# Patient Record
Sex: MALE | Race: WHITE
[De-identification: names, ages, dates, MRNs, and addresses within clinical notes are randomized per-mention and may not be internally consistent; named-entity substitution may affect disease eponyms.]

---

## 2017-03-29 ENCOUNTER — HOSPITAL ENCOUNTER (EMERGENCY)
Dept: HOSPITAL 8 - ED | Age: 52
Discharge: HOME | End: 2017-03-29
Payer: MEDICAID

## 2017-03-29 VITALS — SYSTOLIC BLOOD PRESSURE: 129 MMHG | DIASTOLIC BLOOD PRESSURE: 73 MMHG

## 2017-03-29 VITALS — WEIGHT: 154.32 LBS | BODY MASS INDEX: 24.22 KG/M2 | HEIGHT: 67 IN

## 2017-03-29 DIAGNOSIS — J44.1: Primary | ICD-10-CM

## 2017-03-29 DIAGNOSIS — R42: ICD-10-CM

## 2017-03-29 DIAGNOSIS — F17.210: ICD-10-CM

## 2017-03-29 DIAGNOSIS — J18.1: ICD-10-CM

## 2017-03-29 DIAGNOSIS — J45.909: ICD-10-CM

## 2017-03-29 LAB
AST SERPL-CCNC: 21 U/L (ref 15–37)
BUN SERPL-MCNC: 9 MG/DL (ref 7–18)
HGB BLD-MCNC: 15.7 G/DL (ref 13.7–18)

## 2017-03-29 PROCEDURE — 85025 COMPLETE CBC W/AUTO DIFF WBC: CPT

## 2017-03-29 PROCEDURE — 94640 AIRWAY INHALATION TREATMENT: CPT

## 2017-03-29 PROCEDURE — 80053 COMPREHEN METABOLIC PANEL: CPT

## 2017-03-29 PROCEDURE — 96374 THER/PROPH/DIAG INJ IV PUSH: CPT

## 2017-03-29 PROCEDURE — 71010: CPT

## 2017-03-29 PROCEDURE — 99285 EMERGENCY DEPT VISIT HI MDM: CPT

## 2017-03-29 PROCEDURE — 36415 COLL VENOUS BLD VENIPUNCTURE: CPT

## 2017-07-16 ENCOUNTER — HOSPITAL ENCOUNTER (EMERGENCY)
Dept: HOSPITAL 8 - ED | Age: 52
Discharge: HOME | End: 2017-07-16
Payer: MEDICAID

## 2017-07-16 VITALS — WEIGHT: 142.2 LBS | BODY MASS INDEX: 22.32 KG/M2 | HEIGHT: 67 IN

## 2017-07-16 VITALS — DIASTOLIC BLOOD PRESSURE: 82 MMHG | SYSTOLIC BLOOD PRESSURE: 116 MMHG

## 2017-07-16 DIAGNOSIS — J44.1: Primary | ICD-10-CM

## 2017-07-16 DIAGNOSIS — J45.909: ICD-10-CM

## 2017-07-16 DIAGNOSIS — Z88.0: ICD-10-CM

## 2017-07-16 PROCEDURE — 93005 ELECTROCARDIOGRAM TRACING: CPT

## 2017-07-16 PROCEDURE — 94640 AIRWAY INHALATION TREATMENT: CPT

## 2017-07-16 PROCEDURE — 99283 EMERGENCY DEPT VISIT LOW MDM: CPT

## 2017-08-17 ENCOUNTER — HOSPITAL ENCOUNTER (INPATIENT)
Facility: MEDICAL CENTER | Age: 52
LOS: 1 days | DRG: 189 | End: 2017-08-18
Attending: EMERGENCY MEDICINE | Admitting: INTERNAL MEDICINE
Payer: MEDICAID

## 2017-08-17 ENCOUNTER — RESOLUTE PROFESSIONAL BILLING HOSPITAL PROF FEE (OUTPATIENT)
Dept: HOSPITALIST | Facility: MEDICAL CENTER | Age: 52
End: 2017-08-17
Payer: MEDICAID

## 2017-08-17 ENCOUNTER — APPOINTMENT (OUTPATIENT)
Dept: RADIOLOGY | Facility: MEDICAL CENTER | Age: 52
DRG: 189 | End: 2017-08-17
Attending: EMERGENCY MEDICINE
Payer: MEDICAID

## 2017-08-17 DIAGNOSIS — J96.01 ACUTE RESPIRATORY FAILURE WITH HYPOXIA (HCC): ICD-10-CM

## 2017-08-17 PROBLEM — J44.1 COPD EXACERBATION (HCC): Status: ACTIVE | Noted: 2017-08-17

## 2017-08-17 PROBLEM — J96.02 ACUTE RESPIRATORY FAILURE WITH HYPOXIA AND HYPERCAPNIA (HCC): Status: ACTIVE | Noted: 2017-08-17

## 2017-08-17 PROBLEM — D72.829 LEUKOCYTOSIS: Status: ACTIVE | Noted: 2017-08-17

## 2017-08-17 PROBLEM — J96.00 ACUTE RESPIRATORY FAILURE (HCC): Status: ACTIVE | Noted: 2017-08-17

## 2017-08-17 PROBLEM — R09.02 HYPOXEMIA: Status: ACTIVE | Noted: 2017-08-17

## 2017-08-17 PROBLEM — R00.0 TACHYCARDIA: Status: ACTIVE | Noted: 2017-08-17

## 2017-08-17 LAB
ALBUMIN SERPL BCP-MCNC: 4.2 G/DL (ref 3.2–4.9)
ALBUMIN/GLOB SERPL: 1.4 G/DL
ALP SERPL-CCNC: 63 U/L (ref 30–99)
ALT SERPL-CCNC: 14 U/L (ref 2–50)
ANION GAP SERPL CALC-SCNC: 8 MMOL/L (ref 0–11.9)
APTT PPP: 27.4 SEC (ref 24.7–36)
AST SERPL-CCNC: 20 U/L (ref 12–45)
BASE EXCESS BLDA CALC-SCNC: 1 MMOL/L (ref -4–3)
BASOPHILS # BLD AUTO: 0.8 % (ref 0–1.8)
BASOPHILS # BLD: 0.09 K/UL (ref 0–0.12)
BILIRUB SERPL-MCNC: 0.4 MG/DL (ref 0.1–1.5)
BLOOD CULTURE HOLD CXBCH: NORMAL
BLOOD CULTURE HOLD CXBCH: NORMAL
BNP SERPL-MCNC: 40 PG/ML (ref 0–100)
BODY TEMPERATURE: ABNORMAL DEGREES
BUN SERPL-MCNC: 8 MG/DL (ref 8–22)
CALCIUM SERPL-MCNC: 9.5 MG/DL (ref 8.5–10.5)
CHLORIDE SERPL-SCNC: 104 MMOL/L (ref 96–112)
CO2 BLDA-SCNC: 29 MMOL/L (ref 20–33)
CO2 SERPL-SCNC: 26 MMOL/L (ref 20–33)
CREAT SERPL-MCNC: 0.69 MG/DL (ref 0.5–1.4)
EOSINOPHIL # BLD AUTO: 0.62 K/UL (ref 0–0.51)
EOSINOPHIL NFR BLD: 5.7 % (ref 0–6.9)
ERYTHROCYTE [DISTWIDTH] IN BLOOD BY AUTOMATED COUNT: 43.3 FL (ref 35.9–50)
GFR SERPL CREATININE-BSD FRML MDRD: >60 ML/MIN/1.73 M 2
GLOBULIN SER CALC-MCNC: 3.1 G/DL (ref 1.9–3.5)
GLUCOSE SERPL-MCNC: 85 MG/DL (ref 65–99)
HCO3 BLDA-SCNC: 27.3 MMOL/L (ref 17–25)
HCT VFR BLD AUTO: 48.3 % (ref 42–52)
HGB BLD-MCNC: 16.2 G/DL (ref 14–18)
IMM GRANULOCYTES # BLD AUTO: 0.03 K/UL (ref 0–0.11)
IMM GRANULOCYTES NFR BLD AUTO: 0.3 % (ref 0–0.9)
INR PPP: 0.94 (ref 0.87–1.13)
LYMPHOCYTES # BLD AUTO: 1.94 K/UL (ref 1–4.8)
LYMPHOCYTES NFR BLD: 17.7 % (ref 22–41)
MAGNESIUM SERPL-MCNC: 2.1 MG/DL (ref 1.5–2.5)
MCH RBC QN AUTO: 31.6 PG (ref 27–33)
MCHC RBC AUTO-ENTMCNC: 33.5 G/DL (ref 33.7–35.3)
MCV RBC AUTO: 94.3 FL (ref 81.4–97.8)
MONOCYTES # BLD AUTO: 1.04 K/UL (ref 0–0.85)
MONOCYTES NFR BLD AUTO: 9.5 % (ref 0–13.4)
NEUTROPHILS # BLD AUTO: 7.25 K/UL (ref 1.82–7.42)
NEUTROPHILS NFR BLD: 66 % (ref 44–72)
NRBC # BLD AUTO: 0 K/UL
NRBC BLD AUTO-RTO: 0 /100 WBC
O2/TOTAL GAS SETTING VFR VENT: 40 %
PCO2 BLDA: 47.8 MMHG (ref 26–37)
PH BLDA: 7.37 [PH] (ref 7.4–7.5)
PHOSPHATE SERPL-MCNC: 4.3 MG/DL (ref 2.5–4.5)
PLATELET # BLD AUTO: 249 K/UL (ref 164–446)
PMV BLD AUTO: 10.1 FL (ref 9–12.9)
PO2 BLDA: 93 MMHG (ref 64–87)
POTASSIUM SERPL-SCNC: 4.5 MMOL/L (ref 3.6–5.5)
PROT SERPL-MCNC: 7.3 G/DL (ref 6–8.2)
PROTHROMBIN TIME: 12.9 SEC (ref 12–14.6)
RBC # BLD AUTO: 5.12 M/UL (ref 4.7–6.1)
SAO2 % BLDA: 97 % (ref 93–99)
SODIUM SERPL-SCNC: 138 MMOL/L (ref 135–145)
SPECIMEN DRAWN FROM PATIENT: ABNORMAL
TROPONIN I SERPL-MCNC: <0.01 NG/ML (ref 0–0.04)
WBC # BLD AUTO: 11 K/UL (ref 4.8–10.8)

## 2017-08-17 PROCEDURE — 96365 THER/PROPH/DIAG IV INF INIT: CPT

## 2017-08-17 PROCEDURE — 84100 ASSAY OF PHOSPHORUS: CPT

## 2017-08-17 PROCEDURE — 700101 HCHG RX REV CODE 250: Performed by: EMERGENCY MEDICINE

## 2017-08-17 PROCEDURE — 99223 1ST HOSP IP/OBS HIGH 75: CPT | Performed by: INTERNAL MEDICINE

## 2017-08-17 PROCEDURE — 99285 EMERGENCY DEPT VISIT HI MDM: CPT

## 2017-08-17 PROCEDURE — 94640 AIRWAY INHALATION TREATMENT: CPT

## 2017-08-17 PROCEDURE — 700111 HCHG RX REV CODE 636 W/ 250 OVERRIDE (IP): Performed by: EMERGENCY MEDICINE

## 2017-08-17 PROCEDURE — 94002 VENT MGMT INPAT INIT DAY: CPT

## 2017-08-17 PROCEDURE — 90732 PPSV23 VACC 2 YRS+ SUBQ/IM: CPT | Performed by: EMERGENCY MEDICINE

## 2017-08-17 PROCEDURE — 83735 ASSAY OF MAGNESIUM: CPT

## 2017-08-17 PROCEDURE — 82803 BLOOD GASES ANY COMBINATION: CPT

## 2017-08-17 PROCEDURE — 700105 HCHG RX REV CODE 258: Performed by: EMERGENCY MEDICINE

## 2017-08-17 PROCEDURE — 93005 ELECTROCARDIOGRAM TRACING: CPT | Performed by: EMERGENCY MEDICINE

## 2017-08-17 PROCEDURE — 700101 HCHG RX REV CODE 250: Performed by: INTERNAL MEDICINE

## 2017-08-17 PROCEDURE — 96375 TX/PRO/DX INJ NEW DRUG ADDON: CPT

## 2017-08-17 PROCEDURE — 700101 HCHG RX REV CODE 250

## 2017-08-17 PROCEDURE — A9270 NON-COVERED ITEM OR SERVICE: HCPCS | Performed by: INTERNAL MEDICINE

## 2017-08-17 PROCEDURE — 700111 HCHG RX REV CODE 636 W/ 250 OVERRIDE (IP): Performed by: INTERNAL MEDICINE

## 2017-08-17 PROCEDURE — 84484 ASSAY OF TROPONIN QUANT: CPT

## 2017-08-17 PROCEDURE — 85730 THROMBOPLASTIN TIME PARTIAL: CPT

## 2017-08-17 PROCEDURE — 85025 COMPLETE CBC W/AUTO DIFF WBC: CPT

## 2017-08-17 PROCEDURE — 80053 COMPREHEN METABOLIC PANEL: CPT

## 2017-08-17 PROCEDURE — 85610 PROTHROMBIN TIME: CPT

## 2017-08-17 PROCEDURE — 36600 WITHDRAWAL OF ARTERIAL BLOOD: CPT

## 2017-08-17 PROCEDURE — 83880 ASSAY OF NATRIURETIC PEPTIDE: CPT

## 2017-08-17 PROCEDURE — 90471 IMMUNIZATION ADMIN: CPT

## 2017-08-17 PROCEDURE — 3E0234Z INTRODUCTION OF SERUM, TOXOID AND VACCINE INTO MUSCLE, PERCUTANEOUS APPROACH: ICD-10-PCS | Performed by: EMERGENCY MEDICINE

## 2017-08-17 PROCEDURE — 770020 HCHG ROOM/CARE - TELE (206)

## 2017-08-17 PROCEDURE — 71010 DX-CHEST-PORTABLE (1 VIEW): CPT

## 2017-08-17 PROCEDURE — 700102 HCHG RX REV CODE 250 W/ 637 OVERRIDE(OP): Performed by: INTERNAL MEDICINE

## 2017-08-17 RX ORDER — NICOTINE 21 MG/24HR
14 PATCH, TRANSDERMAL 24 HOURS TRANSDERMAL
Status: DISCONTINUED | OUTPATIENT
Start: 2017-08-17 | End: 2017-08-18 | Stop reason: HOSPADM

## 2017-08-17 RX ORDER — BISACODYL 10 MG
10 SUPPOSITORY, RECTAL RECTAL
Status: DISCONTINUED | OUTPATIENT
Start: 2017-08-17 | End: 2017-08-18 | Stop reason: HOSPADM

## 2017-08-17 RX ORDER — TIOTROPIUM BROMIDE 18 UG/1
18 CAPSULE ORAL; RESPIRATORY (INHALATION) DAILY
Status: ON HOLD | COMMUNITY
End: 2017-08-18

## 2017-08-17 RX ORDER — MAGNESIUM SULFATE 1 G/100ML
1 INJECTION INTRAVENOUS ONCE
Status: COMPLETED | OUTPATIENT
Start: 2017-08-17 | End: 2017-08-17

## 2017-08-17 RX ORDER — LABETALOL HYDROCHLORIDE 5 MG/ML
5 INJECTION, SOLUTION INTRAVENOUS EVERY 4 HOURS PRN
Status: DISCONTINUED | OUTPATIENT
Start: 2017-08-17 | End: 2017-08-18 | Stop reason: HOSPADM

## 2017-08-17 RX ORDER — AZITHROMYCIN 250 MG/1
500 TABLET, FILM COATED ORAL ONCE
Status: COMPLETED | OUTPATIENT
Start: 2017-08-17 | End: 2017-08-17

## 2017-08-17 RX ORDER — AZITHROMYCIN 250 MG/1
250 TABLET, FILM COATED ORAL DAILY
Status: DISCONTINUED | OUTPATIENT
Start: 2017-08-18 | End: 2017-08-18 | Stop reason: HOSPADM

## 2017-08-17 RX ORDER — METHYLPREDNISOLONE SODIUM SUCCINATE 125 MG/2ML
125 INJECTION, POWDER, LYOPHILIZED, FOR SOLUTION INTRAMUSCULAR; INTRAVENOUS ONCE
Status: COMPLETED | OUTPATIENT
Start: 2017-08-17 | End: 2017-08-17

## 2017-08-17 RX ORDER — IPRATROPIUM BROMIDE AND ALBUTEROL SULFATE 2.5; .5 MG/3ML; MG/3ML
3 SOLUTION RESPIRATORY (INHALATION)
Status: DISCONTINUED | OUTPATIENT
Start: 2017-08-17 | End: 2017-08-18 | Stop reason: HOSPADM

## 2017-08-17 RX ORDER — ACETAMINOPHEN 325 MG/1
650 TABLET ORAL EVERY 6 HOURS PRN
Status: DISCONTINUED | OUTPATIENT
Start: 2017-08-17 | End: 2017-08-18 | Stop reason: HOSPADM

## 2017-08-17 RX ORDER — IPRATROPIUM BROMIDE AND ALBUTEROL SULFATE 2.5; .5 MG/3ML; MG/3ML
3 SOLUTION RESPIRATORY (INHALATION)
Status: DISCONTINUED | OUTPATIENT
Start: 2017-08-17 | End: 2017-08-17 | Stop reason: ALTCHOICE

## 2017-08-17 RX ORDER — SODIUM CHLORIDE 9 MG/ML
1000 INJECTION, SOLUTION INTRAVENOUS ONCE
Status: COMPLETED | OUTPATIENT
Start: 2017-08-17 | End: 2017-08-17

## 2017-08-17 RX ORDER — HEPARIN SODIUM 5000 [USP'U]/ML
5000 INJECTION, SOLUTION INTRAVENOUS; SUBCUTANEOUS EVERY 8 HOURS
Status: DISCONTINUED | OUTPATIENT
Start: 2017-08-17 | End: 2017-08-18 | Stop reason: HOSPADM

## 2017-08-17 RX ORDER — BUDESONIDE AND FORMOTEROL FUMARATE DIHYDRATE 80; 4.5 UG/1; UG/1
2 AEROSOL RESPIRATORY (INHALATION)
Status: DISCONTINUED | OUTPATIENT
Start: 2017-08-17 | End: 2017-08-18 | Stop reason: HOSPADM

## 2017-08-17 RX ORDER — POLYETHYLENE GLYCOL 3350 17 G/17G
1 POWDER, FOR SOLUTION ORAL
Status: DISCONTINUED | OUTPATIENT
Start: 2017-08-17 | End: 2017-08-18 | Stop reason: HOSPADM

## 2017-08-17 RX ORDER — AMOXICILLIN 250 MG
2 CAPSULE ORAL 2 TIMES DAILY
Status: DISCONTINUED | OUTPATIENT
Start: 2017-08-17 | End: 2017-08-18 | Stop reason: HOSPADM

## 2017-08-17 RX ORDER — METHYLPREDNISOLONE SODIUM SUCCINATE 125 MG/2ML
60 INJECTION, POWDER, LYOPHILIZED, FOR SOLUTION INTRAMUSCULAR; INTRAVENOUS EVERY 6 HOURS
Status: DISCONTINUED | OUTPATIENT
Start: 2017-08-17 | End: 2017-08-18 | Stop reason: HOSPADM

## 2017-08-17 RX ORDER — IPRATROPIUM BROMIDE AND ALBUTEROL SULFATE 2.5; .5 MG/3ML; MG/3ML
3 SOLUTION RESPIRATORY (INHALATION)
Status: DISCONTINUED | OUTPATIENT
Start: 2017-08-17 | End: 2017-08-17

## 2017-08-17 RX ORDER — ALBUTEROL SULFATE 2.5 MG/3ML
2.5 SOLUTION RESPIRATORY (INHALATION) EVERY 4 HOURS PRN
Status: ON HOLD | COMMUNITY
End: 2017-08-18

## 2017-08-17 RX ADMIN — METHYLPREDNISOLONE SODIUM SUCCINATE 60 MG: 125 INJECTION, POWDER, FOR SOLUTION INTRAMUSCULAR; INTRAVENOUS at 18:06

## 2017-08-17 RX ADMIN — IPRATROPIUM BROMIDE AND ALBUTEROL SULFATE 3 ML: .5; 3 SOLUTION RESPIRATORY (INHALATION) at 14:31

## 2017-08-17 RX ADMIN — AZITHROMYCIN 500 MG: 250 TABLET, FILM COATED ORAL at 11:59

## 2017-08-17 RX ADMIN — ALBUTEROL SULFATE 10 MG/HR: 5 SOLUTION RESPIRATORY (INHALATION) at 07:45

## 2017-08-17 RX ADMIN — PNEUMOCOCCAL VACCINE POLYVALENT 25 MCG
25; 25; 25; 25; 25; 25; 25; 25; 25; 25; 25; 25; 25; 25; 25; 25; 25; 25; 25; 25; 25; 25; 25 INJECTION, SOLUTION INTRAMUSCULAR; SUBCUTANEOUS at 14:10

## 2017-08-17 RX ADMIN — MAGNESIUM SULFATE IN DEXTROSE 1 G: 10 INJECTION, SOLUTION INTRAVENOUS at 08:47

## 2017-08-17 RX ADMIN — HEPARIN SODIUM 5000 UNITS: 5000 INJECTION, SOLUTION INTRAVENOUS; SUBCUTANEOUS at 14:09

## 2017-08-17 RX ADMIN — SODIUM CHLORIDE 1000 ML: 9 INJECTION, SOLUTION INTRAVENOUS at 07:53

## 2017-08-17 RX ADMIN — FENTANYL CITRATE 50 MCG: 50 INJECTION, SOLUTION INTRAMUSCULAR; INTRAVENOUS at 08:47

## 2017-08-17 RX ADMIN — METHYLPREDNISOLONE SODIUM SUCCINATE 60 MG: 125 INJECTION, POWDER, FOR SOLUTION INTRAMUSCULAR; INTRAVENOUS at 14:30

## 2017-08-17 RX ADMIN — METHYLPREDNISOLONE SODIUM SUCCINATE 125 MG: 125 INJECTION, POWDER, FOR SOLUTION INTRAMUSCULAR; INTRAVENOUS at 07:53

## 2017-08-17 RX ADMIN — HEPARIN SODIUM 5000 UNITS: 5000 INJECTION, SOLUTION INTRAVENOUS; SUBCUTANEOUS at 21:01

## 2017-08-17 RX ADMIN — NICOTINE 14 MG: 14 PATCH, EXTENDED RELEASE TRANSDERMAL at 11:59

## 2017-08-17 RX ADMIN — IPRATROPIUM BROMIDE AND ALBUTEROL SULFATE 3 ML: .5; 3 SOLUTION RESPIRATORY (INHALATION) at 09:55

## 2017-08-17 RX ADMIN — IPRATROPIUM BROMIDE 0.2 MG: 0.5 SOLUTION RESPIRATORY (INHALATION) at 07:45

## 2017-08-17 ASSESSMENT — LIFESTYLE VARIABLES
HOW MANY TIMES IN THE PAST YEAR HAVE YOU HAD 5 OR MORE DRINKS IN A DAY: 1
ON A TYPICAL DAY WHEN YOU DRINK ALCOHOL HOW MANY DRINKS DO YOU HAVE: 1
CONSUMPTION TOTAL: POSITIVE
TOTAL SCORE: 0
EVER_SMOKED: YES
PACK_YEARS: 40
EVER_SMOKED: YES
ALCOHOL_USE: YES
TOTAL SCORE: 0
PACK_YEARS: 40
HAVE PEOPLE ANNOYED YOU BY CRITICIZING YOUR DRINKING: NO
AVERAGE NUMBER OF DAYS PER WEEK YOU HAVE A DRINK CONTAINING ALCOHOL: 3
HAVE YOU EVER FELT YOU SHOULD CUT DOWN ON YOUR DRINKING: NO
EVER HAD A DRINK FIRST THING IN THE MORNING TO STEADY YOUR NERVES TO GET RID OF A HANGOVER: NO
TOTAL SCORE: 0
EVER FELT BAD OR GUILTY ABOUT YOUR DRINKING: NO

## 2017-08-17 ASSESSMENT — COPD QUESTIONNAIRES
DO YOU EVER COUGH UP ANY MUCUS OR PHLEGM?: YES, A FEW DAYS A WEEK OR MONTH
DURING THE PAST 4 WEEKS HOW MUCH DID YOU FEEL SHORT OF BREATH: SOME OF THE TIME
DURING THE PAST 4 WEEKS HOW MUCH DID YOU FEEL SHORT OF BREATH: SOME OF THE TIME
DO YOU EVER COUGH UP ANY MUCUS OR PHLEGM?: NO/ONLY WITH OCCASIONAL COLDS OR INFECTIONS
HAVE YOU SMOKED AT LEAST 100 CIGARETTES IN YOUR ENTIRE LIFE: YES
COPD SCREENING SCORE: 4
HAVE YOU SMOKED AT LEAST 100 CIGARETTES IN YOUR ENTIRE LIFE: YES
COPD SCREENING SCORE: 5

## 2017-08-17 ASSESSMENT — PATIENT HEALTH QUESTIONNAIRE - PHQ9
1. LITTLE INTEREST OR PLEASURE IN DOING THINGS: NOT AT ALL
SUM OF ALL RESPONSES TO PHQ QUESTIONS 1-9: 0
SUM OF ALL RESPONSES TO PHQ9 QUESTIONS 1 AND 2: 0
2. FEELING DOWN, DEPRESSED, IRRITABLE, OR HOPELESS: NOT AT ALL

## 2017-08-17 ASSESSMENT — PULMONARY FUNCTION TESTS
EPAP_CMH2O: 5
EPAP_CMH2O: 5

## 2017-08-17 ASSESSMENT — PAIN SCALES - GENERAL
PAINLEVEL_OUTOF10: 6
PAINLEVEL_OUTOF10: 4
PAINLEVEL_OUTOF10: 0
PAINLEVEL_OUTOF10: 0

## 2017-08-17 NOTE — IP AVS SNAPSHOT
8/18/2017    Osmar Loomis  [address]  [facility] [address]    Dear Osmar:    [facility] wants to ensure your discharge home is safe and you or your loved ones have had all of your questions answered regarding your care after you leave the hospital.    Below is a list of resources and contact information should you have any questions regarding your hospital stay, follow-up instructions, or active medical symptoms.    Questions or Concerns Regarding… Contact   Medical Questions Related to Your Discharge  (7 days a week, 8am-5pm) Contact a Nurse Care Coordinator   [phone]   Medical Questions Not Related to Your Discharge  (24 hours a day / 7 days a week)  Contact the Nurse Health Line   [phone]    Medications or Discharge Instructions Refer to your discharge packet   or contact your [facility] Primary Care Provider   [phone]   Follow-up Appointment(s) Schedule your appointment via [employer]   or contact Scheduling [phone]   Billing Review your statement via [employer]  or contact Billing [phone]   Medical Records Review your records via [employer]   or contact Medical Records [phone]     You may receive a telephone call within two days of discharge. This call is to make certain you understand your discharge instructions and have the opportunity to have any questions answered. You can also easily access your medical information, test results and upcoming appointments via the [employer] free online health management tool. You can learn more and sign up at [employer]. For assistance setting up your [employer] account, please call [phone].    Once again, we want to ensure your discharge home is safe and that you have a clear understanding of any next steps in your care. If you have any questions or concerns, please do not hesitate to contact us, we are here for you. Thank you for choosing [facility] for your healthcare needs.    Sincerely,    Your [facility]  Team

## 2017-08-17 NOTE — H&P
HOSPITAL MEDICINE HISTORY/ PHYSICAL    Date & Time note created:    8/17/2017   9:21 AM       Date & Time Patient was seen:   8/17/2017    Patient ID:   Name: Osmar Loomis  . YOB: 1965. Age: 52 y.o. male. MRN: [id number]    Admitting Attending:  Beverly Fields     PCP : Pcp Pt States None        Chief Complaint:       Shortness of breath    History of Present Illness:    Osmar Loomis is a 52 y.o. male who has history of COPD presents in respiratory distress. Patient started getting   short of breath yesterday. Ran out of all of his inhalers. Patient was noticed to have severe COPD exacerbation and   acute respiratory failure in the ER. Patient was initially treated with BiPAP and also with breathing treatment. Patient's   symptoms improved after BiPAP treatment. Patient currently is off BiPAP. Patient otherwise denies fever, chills, nausea,   vomiting, adb pain,  CP, headache, constipation, diarrhea, cough, or sputum.    Review of Systems:      per HPI otherwise 14 points reviewed 12 systems neg per AMA/CMS criteria.        Past Medical/ Family / Social history (PFSH):   Past Medical History   Diagnosis Date   • Asthma    • COPD (chronic obstructive pulmonary disease) (CMS-[facility])      History reviewed. No pertinent past surgical history.  Current Outpatient Medications:  No current facility-administered medications on file prior to encounter.     Current Outpatient Prescriptions on File Prior to Encounter   Medication Sig Dispense Refill   • albuterol (PROVENTIL) 2.5mg/3ml NEBU solution for nebulization 3 mL by Nebulization route every four hours as needed for Shortness of Breath. 25 mL 1   • albuterol (VENTOLIN OR PROVENTIL) 108 (90 BASE) MCG/ACT AERS inhalation aerosol Inhale 2 Puffs by mouth every 2 hours as needed for Shortness of Breath. 1 Inhaler 2   • albuterol (VENTOLIN OR PROVENTIL) 108 (90 BASE) MCG/ACT AERS inhalation aerosol Inhale 2 Puffs by mouth every 2 hours as needed for Shortness  of Breath. 1 Inhaler 2   • albuterol-ipratropium (COMBIVENT)  MCG/ACT AERO Inhale 2 Puffs by mouth 4 times a day.       Medication Allergy/Sensitivities:  Allergies   Allergen Reactions   • Pcn [Penicillins]      Family History:  History reviewed. No pertinent family history.   reviewed and felt non pertinent to this encounter     Social History:  Social History   Substance Use Topics   • Smoking status: Former Smoker -- 0.02 packs/day     Quit date: 07/17/2017   • Smokeless tobacco: None   • Alcohol Use: No     #################################################################  Physical Exam:   Vitals/ General Appearance:   Weight/BMI: Body mass index is 25.05 kg/(m^2).  Blood pressure 169/124, pulse 111, temperature 36 °C (96.8 °F), resp. rate 24, weight 72.576 kg (160 lb), SpO2 95 %.   Filed Vitals:    08/17/17 0800 08/17/17 0830 08/17/17 0846 08/17/17 0901   BP:       Pulse: 87 104 102 111   Temp:       Resp: 23 17 20 24   Weight:       SpO2: 99% 99% 98% 95%    Oxygen Therapy:  Pulse Oximetry: 95 %, O2 (LPM): 2, O2 Delivery: Nasal Cannula    Constitutional:  well developed, well nourished, non-toxic, no acute distress  HENMT: Normocephalic, atraumatic, b/l ears normal, nose normal  Eyes:  EOMI, conjunctiva normal, no discharge  Neck: no tracheal deviation, supple  Cardiovascular: tachycardic, normal rhythm, no murmurs, no rubs or gallops; no cyanosis, clubbing or edema  Lungs: Respiratory effort is normal, normal breath sounds, breath sounds clear to auscultation b/l, no rales, rhonchi or wheezing  Abdomen: soft, non-tender, no guarding or rebound, active BS, no mass  Skin: warm, dry, no erythema, no rash  Neurologic: Alert and oriented, strength 5/5, no focal deficits, CN II-XII normal  Psychiatric: No anxiety or depression  Lymph node: No lymphadenopathy appreciated in the neck groin and axillary area.   Extremities: Bilateral lower extremities no pitting edema, bilateral pulses  symmetric  #################################################################  Lab Data Review:    Objective  Recent Results (from the past 24 hour(s))   CBC w/ Differential    Collection Time: 08/17/17  7:35 AM   Result Value Ref Range    WBC 11.0 (H) 4.8 - 10.8 K/uL    RBC 5.12 4.70 - 6.10 M/uL    Hemoglobin 16.2 14.0 - 18.0 g/dL    Hematocrit 48.3 42.0 - 52.0 %    MCV 94.3 81.4 - 97.8 fL    MCH 31.6 27.0 - 33.0 pg    MCHC 33.5 (L) 33.7 - 35.3 g/dL    RDW 43.3 35.9 - 50.0 fL    Platelet Count 249 164 - 446 K/uL    MPV 10.1 9.0 - 12.9 fL    Neutrophils-Polys 66.00 44.00 - 72.00 %    Lymphocytes 17.70 (L) 22.00 - 41.00 %    Monocytes 9.50 0.00 - 13.40 %    Eosinophils 5.70 0.00 - 6.90 %    Basophils 0.80 0.00 - 1.80 %    Immature Granulocytes 0.30 0.00 - 0.90 %    Nucleated RBC 0.00 /100 WBC    Neutrophils (Absolute) 7.25 1.82 - 7.42 K/uL    Lymphs (Absolute) 1.94 1.00 - 4.80 K/uL    Monos (Absolute) 1.04 (H) 0.00 - 0.85 K/uL    Eos (Absolute) 0.62 (H) 0.00 - 0.51 K/uL    Baso (Absolute) 0.09 0.00 - 0.12 K/uL    Immature Granulocytes (abs) 0.03 0.00 - 0.11 K/uL    NRBC (Absolute) 0.00 K/uL   Complete Metabolic Panel (CMP)    Collection Time: 08/17/17  7:35 AM   Result Value Ref Range    Sodium 138 135 - 145 mmol/L    Potassium 4.5 3.6 - 5.5 mmol/L    Chloride 104 96 - 112 mmol/L    Co2 26 20 - 33 mmol/L    Anion Gap 8.0 0.0 - 11.9    Glucose 85 65 - 99 mg/dL    Bun 8 8 - 22 mg/dL    Creatinine 0.69 0.50 - 1.40 mg/dL    Calcium 9.5 8.5 - 10.5 mg/dL    AST(SGOT) 20 12 - 45 U/L    ALT(SGPT) 14 2 - 50 U/L    Alkaline Phosphatase 63 30 - 99 U/L    Total Bilirubin 0.4 0.1 - 1.5 mg/dL    Albumin 4.2 3.2 - 4.9 g/dL    Total Protein 7.3 6.0 - 8.2 g/dL    Globulin 3.1 1.9 - 3.5 g/dL    A-G Ratio 1.4 g/dL   Btype Natriuretic Peptide    Collection Time: 08/17/17  7:35 AM   Result Value Ref Range    B Natriuretic Peptide 40 0 - 100 pg/mL   Troponin STAT    Collection Time: 08/17/17  7:35 AM   Result Value Ref Range    Troponin I  <0.01 0.00 - 0.04 ng/mL   Magnesium    Collection Time: 08/17/17  7:35 AM   Result Value Ref Range    Magnesium 2.1 1.5 - 2.5 mg/dL   Phosphorus    Collection Time: 08/17/17  7:35 AM   Result Value Ref Range    Phosphorus 4.3 2.5 - 4.5 mg/dL   ESTIMATED GFR    Collection Time: 08/17/17  7:35 AM   Result Value Ref Range    GFR If African American >60 >60 mL/min/1.73 m 2    GFR If Non African American >60 >60 mL/min/1.73 m 2   BLOOD CULTURE,HOLD    Collection Time: 08/17/17  7:35 AM   Result Value Ref Range    Blood Culture Hold Collected    APTT    Collection Time: 08/17/17  7:51 AM   Result Value Ref Range    APTT 27.4 24.7 - 36.0 sec   PT/INR    Collection Time: 08/17/17  7:51 AM   Result Value Ref Range    PT 12.9 12.0 - 14.6 sec    INR 0.94 0.87 - 1.13   BLOOD CULTURE,HOLD    Collection Time: 08/17/17  7:52 AM   Result Value Ref Range    Blood Culture Hold Collected    ISTAT ARTERIAL BLOOD GAS    Collection Time: 08/17/17  8:13 AM   Result Value Ref Range    Ph 7.366 (L) 7.400 - 7.500    Pco2 47.8 (H) 26.0 - 37.0 mmHg    Po2 93 (H) 64 - 87 mmHg    Tco2 29 20 - 33 mmol/L    S02 97 93 - 99 %    Hco3 27.3 (H) 17.0 - 25.0 mmol/L    BE 1 -4 - 3 mmol/L    Body Temp see below degrees    O2 Therapy 40 %    Specimen Arterial        (click the triangle to expand results)    Imaging/Procedures Review:    DX-CHEST-PORTABLE (1 VIEW)   Final Result      7.5 mm nodular density in the left midlung zone. Further evaluation with CT chest is recommended.        EKG:   per my independant read:  HR: 110, Normal Sinus Rhythm, no ST/T changes    Assessment and Plan:      * Acute respiratory failure with hypoxia and hypercapnia (CMS-HCC)  Assessment & Plan  Acute COPD exacerbation due to run out of medication at home  Hypoxemia  Needed BiPAP initially, but currently off BiPAP  Close monitor and manage COPD    Hypoxemia  Assessment & Plan  O2 and RT  BD    COPD exacerbation (CMS-[facility])  Assessment & Plan  COPD exacerbation  - COPD  protocol  -IV Solu-Medrol  - RT protocol  - Mucinex for congestion      Tachycardia (present on admission)  Assessment & Plan  Due to COPD exacerbation and respiratory failure  Telemetry monitor    Leukocytosis (present on admission)  Assessment & Plan  No signs of infection likely due to stress-induced        1. Prophylaxis: sc heparin  2. Code: Full code per patient   3. Dispo: he will be admitted to inpatient for management that is expected to take more than 2 midnights   I have discussed patient admission status with  in the ER.    I spent 73 minutes evaluating Mr. Loomis, reviewing the chart, vitals, labs and imaging, discussing the case with ED physician, medication reconciliation, placing orders and enacting the plan above.    This dictation was created using voice recognition software. The accuracy of the dictation is limited to the abilities of the software. Although every efforts have been used to decrease the error, I expect there may be some errors of grammar and possibly content.

## 2017-08-17 NOTE — ASSESSMENT & PLAN NOTE
Acute COPD exacerbation due to run out of medication at home  Hypoxemia  Needed BiPAP initially, but currently off BiPAP  Close monitor and manage COPD

## 2017-08-17 NOTE — IP AVS SNAPSHOT
" Home Care Instructions                                                                                                                  Name:Osmar Loomis  Medical Record Number:[id number]  CSN: [id number]    YOB: 1965   Age: 52 y.o.  Sex: male  HT:1.676 m (5' 5.98\") WT: 65.4 kg (144 lb 2.9 oz)          Admit Date: 8/17/2017     Discharge Date:   Today's Date: 8/18/2017  Attending Doctor:  GOPAL Conrad*                  Allergies:  Pcn            Discharge Instructions       Discharge Instructions    Discharged to home by car with self. Discharged via walking, hospital escort: Refused.  Special equipment needed: Not Applicable    Be sure to schedule a follow-up appointment with your primary care doctor or any specialists as instructed.     Discharge Plan:   Smoking Cessation Offered: Patient Counseled  Pneumococcal Vaccine Given - only chart on this line when given: Given (See MAR)  Influenza Vaccine Indication: Indicated: Not available from distributor/    I understand that a diet low in cholesterol, fat, and sodium is recommended for good health. Unless I have been given specific instructions below for another diet, I accept this instruction as my diet prescription.   Other diet: regular    Special Instructions: None    · Is patient discharged on Warfarin / Coumadin?   No     · Is patient Post Blood Transfusion NO    Chronic Obstructive Pulmonary Disease  Chronic obstructive pulmonary disease (COPD) is a common lung condition in which airflow from the lungs is limited. COPD is a general term that can be used to describe many different lung problems that limit airflow, including both chronic bronchitis and emphysema. If you have COPD, your lung function will probably never return to normal, but there are measures you can take to improve lung function and make yourself feel better.  CAUSES   Smoking (common).  Exposure to secondhand smoke.  Genetic problems.  Chronic " inflammatory lung diseases or recurrent infections.  SYMPTOMS  Shortness of breath, especially with physical activity.  Deep, persistent (chronic) cough with a large amount of thick mucus.  Wheezing.  Rapid breaths (tachypnea).  Gray or bluish discoloration (cyanosis) of the skin, especially in your fingers, toes, or lips.  Fatigue.  Weight loss.  Frequent infections or episodes when breathing symptoms become much worse (exacerbations).  Chest tightness.  DIAGNOSIS  Your health care provider will take a medical history and perform a physical examination to diagnose COPD. Additional tests for COPD may include:  Lung (pulmonary) function tests.  Chest X-ray.  CT scan.  Blood tests.  TREATMENT   Treatment for COPD may include:  Inhaler and nebulizer medicines. These help manage the symptoms of COPD and make your breathing more comfortable.  Supplemental oxygen. Supplemental oxygen is only helpful if you have a low oxygen level in your blood.  Exercise and physical activity. These are beneficial for nearly all people with COPD.  Lung surgery or transplant.  Nutrition therapy to gain weight, if you are underweight.  Pulmonary rehabilitation. This may involve working with a team of health care providers and specialists, such as respiratory, occupational, and physical therapists.  HOME CARE INSTRUCTIONS  Take all medicines (inhaled or pills) as directed by your health care provider.  Avoid over-the-counter medicines or cough syrups that dry up your airway (such as antihistamines) and slow down the elimination of secretions unless instructed otherwise by your health care provider.  If you are a smoker, the most important thing that you can do is stop smoking. Continuing to smoke will cause further lung damage and breathing trouble. Ask your health care provider for help with quitting smoking. He or she can direct you to community resources or hospitals that provide support.  Avoid exposure to irritants such as smoke,  chemicals, and fumes that aggravate your breathing.  Use oxygen therapy and pulmonary rehabilitation if directed by your health care provider. If you require home oxygen therapy, ask your health care provider whether you should purchase a pulse oximeter to measure your oxygen level at home.  Avoid contact with individuals who have a contagious illness.  Avoid extreme temperature and humidity changes.  Eat healthy foods. Eating smaller, more frequent meals and resting before meals may help you maintain your strength.  Stay active, but balance activity with periods of rest. Exercise and physical activity will help you maintain your ability to do things you want to do.  Preventing infection and hospitalization is very important when you have COPD. Make sure to receive all the vaccines your health care provider recommends, especially the pneumococcal and influenza vaccines. Ask your health care provider whether you need a pneumonia vaccine.  Learn and use relaxation techniques to manage stress.  Learn and use controlled breathing techniques as directed by your health care provider. Controlled breathing techniques include:  Pursed lip breathing. Start by breathing in (inhaling) through your nose for 1 second. Then, purse your lips as if you were going to whistle and breathe out (exhale) through the pursed lips for 2 seconds.  Diaphragmatic breathing. Start by putting one hand on your abdomen just above your waist. Inhale slowly through your nose. The hand on your abdomen should move out. Then purse your lips and exhale slowly. You should be able to feel the hand on your abdomen moving in as you exhale.  Learn and use controlled coughing to clear mucus from your lungs. Controlled coughing is a series of short, progressive coughs. The steps of controlled coughing are:  Lean your head slightly forward.  Breathe in deeply using diaphragmatic breathing.  Try to hold your breath for 3 seconds.  Keep your mouth slightly open  while coughing twice.  Spit any mucus out into a tissue.  Rest and repeat the steps once or twice as needed.  SEEK MEDICAL CARE IF:  You are coughing up more mucus than usual.  There is a change in the color or thickness of your mucus.  Your breathing is more labored than usual.  Your breathing is faster than usual.  SEEK IMMEDIATE MEDICAL CARE IF:  You have shortness of breath while you are resting.  You have shortness of breath that prevents you from:  Being able to talk.  Performing your usual physical activities.  You have chest pain lasting longer than 5 minutes.  Your skin color is more cyanotic than usual.  You measure low oxygen saturations for longer than 5 minutes with a pulse oximeter.  MAKE SURE YOU:  Understand these instructions.  Will watch your condition.  Will get help right away if you are not doing well or get worse.     This information is not intended to replace advice given to you by your health care provider. Make sure you discuss any questions you have with your health care provider.     Document Released: 09/27/2006 Document Revised: 01/08/2016 Document Reviewed: 08/14/2014  [employer] Interactive Patient Education ©2016 [employer]      Depression / Suicide Risk    As you are discharged from this [facility] facility, it is important to learn how to keep safe from harming yourself.    Recognize the warning signs:  · Abrupt changes in personality, positive or negative- including increase in energy   · Giving away possessions  · Change in eating patterns- significant weight changes-  positive or negative  · Change in sleeping patterns- unable to sleep or sleeping all the time   · Unwillingness or inability to communicate  · Depression  · Unusual sadness, discouragement and loneliness  · Talk of wanting to die  · Neglect of personal appearance   · Rebelliousness- reckless behavior  · Withdrawal from people/activities they love  · Confusion- inability to concentrate     If you or a loved one  observes any of these behaviors or has concerns about self-harm, here's what you can do:  · Talk about it- your feelings and reasons for harming yourself  · Remove any means that you might use to hurt yourself (examples: pills, rope, extension cords, firearm)  · Get professional help from the community (Mental Health, Substance Abuse, psychological counseling)  · Do not be alone:Call your Safe Contact- someone whom you trust who will be there for you.  · Call your local CRISIS HOTLINE 986-8060 or 542-643-3620  · Call your local Children's Mobile Crisis Response Team [place] (244) 298-4018 or www.MemberPass  · Call the toll free National Suicide Prevention Hotlines   · National Suicide Prevention Lifeline 275-021-SFML (8911)  · [place] [employer] Line Network 800-SUICIDE (024-7059)           Discharge Medication Instructions:    Below are the medications your physician expects you to take upon discharge:    Review all your home medications and newly ordered medications with your doctor and/or pharmacist. Follow medication instructions as directed by your doctor and/or pharmacist.    Please keep your medication list with you and share with your physician.               Medication List      START taking these medications        Instructions    Morning Afternoon Evening Bedtime    azithromycin 250 MG Tabs   Last time this was given:  250 mg on 8/18/2017  8:34 AM   Commonly known as:  ZITHROMAX        1 daily                        fluticasone-salmeterol 250-50 MCG/DOSE Aepb   Commonly known as:  ADVAIR        Inhale 1 Puff by mouth every 12 hours.   Dose:  1 Puff                        methylPREDNISolone 4 MG Tabs   Commonly known as:  MEDROL        1 tab 4 times a day for 3 days, 1 tab 3 times a day for 3 days, 1 tab 2 times a day for 3 days, 1 tab daily for 3 days                          CONTINUE taking these medications        Instructions    Morning Afternoon Evening Bedtime    * albuterol 2.5mg/3ml Nebu  solution for nebulization   Commonly known as:  PROVENTIL        3 mL by Nebulization route every four hours as needed for Shortness of Breath.   Dose:  2.5 mg                        * albuterol 108 (90 Base) MCG/ACT Aers inhalation aerosol        Inhale 2 Puffs by mouth every 2 hours as needed for Shortness of Breath.   Dose:  2 Puff                        tiotropium 18 MCG Caps   Commonly known as:  SPIRIVA        Inhale 1 Cap by mouth every day.   Dose:  18 mcg                        * Notice:  This list has 2 medication(s) that are the same as other medications prescribed for you. Read the directions carefully, and ask your doctor or other care provider to review them with you.      STOP taking these medications     BREO ELLIPTA 100-25 MCG/INH Aepb   Generic drug:  Fluticasone Furoate-Vilanterol                    Where to Get Your Medications      These medications were sent to [id number] - [place] - [address] [place] [address] AT [facility] [address] [address]     Phone:  [phone]    - albuterol 108 (90 Base) MCG/ACT Aers inhalation aerosol  - albuterol 2.5mg/3ml Nebu solution for nebulization  - azithromycin 250 MG Tabs  - fluticasone-salmeterol 250-50 MCG/DOSE Aepb  - methylPREDNISolone 4 MG Tabs  - tiotropium 18 MCG Caps            Instructions           Diet / Nutrition:    Follow any diet instructions given to you by your doctor or the dietician, including how much salt (sodium) you are allowed each day.    If you are overweight, talk to your doctor about a weight reduction plan.    Activity:    Remain physically active following your doctor's instructions about exercise and activity.    Rest often.     Any time you become even a little tired or short of breath, SIT DOWN and rest.    Worsening Symptoms:    Report any of the following signs and symptoms to the doctor's office immediately:    *Pain of jaw, arm, or neck  *Chest pain not relieved by medication                                *Dizziness or loss of consciousness  *Difficulty breathing even when at rest   *More tired than usual                                       *Bleeding drainage or swelling of surgical site  *Swelling of feet, ankles, legs or stomach                 *Fever (>100ºF)  *Pink or blood tinged sputum  *Weight gain (3lbs/day or 5lbs /week)           *Shock from internal defibrillator (if applicable)  *Palpitations or irregular heartbeats                *Cool and/or numb extremities    Stroke Awareness    Common Risk Factors for Stroke include:    Age  Atrial Fibrillation  Carotid Artery Stenosis  Diabetes Mellitus  Excessive alcohol consumption  High blood pressure  Overweight   Physical inactivity  Smoking    Warning signs and symptoms of a stroke include:    *Sudden numbness or weakness of the face, arm or leg (especially on one side of the body).  *Sudden confusion, trouble speaking or understanding.  *Sudden trouble seeing in one or both eyes.  *Sudden trouble walking, dizziness, loss of balance or coordination.Sudden severe headache with no known cause.    It is very important to get treatment quickly when a stroke occurs. If you experience any of the above warning signs, call 911 immediately.                   Disclaimer         Quit Smoking / Tobacco Use:    I understand the use of any tobacco products increases my chance of suffering from future heart disease or stroke and could cause other illnesses which may shorten my life. Quitting the use of tobacco products is the single most important thing I can do to improve my health. For further information on smoking / tobacco cessation call a Toll Free Quit Line at 1-364.243.2179 (*[employer]) or 1-458.880.1703 (American Lung Association) or you can access the web based program at www.lungusa.org.    [place] Tobacco Users Help Line:  (246) 582-2562       Toll Free: 1-708.278.3341  Quit Tobacco Program [facility]  (388) 221-6573    Crisis Hotline:    [place] Crisis Hotline:  4-588-FRDCCWS or 1-108.557.9780    [place] Crisis Hotline:    1-584.275.2854 or 605-770-3769    Discharge Survey:   Thank you for choosing [facility]. We hope we did everything we could to make your hospital stay a pleasant one. You may be receiving a phone survey and we would appreciate your time and participation in answering the questions. Your input is very valuable to us in our efforts to improve our service to our patients and their families.        My signature on this form indicates that:    1. I have reviewed and understand the above information.  2. My questions regarding this information have been answered to my satisfaction.  3. I have formulated a plan with my discharge nurse to obtain my prescribed medications for home.                  Disclaimer         __________________________________                     __________       ________                       Patient Signature                                                 Date                    Time

## 2017-08-17 NOTE — ED NOTES
Pt c/o cp for several hours, ERP updated, pt medicated as prescribed.  Pt removed from bipap, tolerated well.

## 2017-08-17 NOTE — IP AVS SNAPSHOT
Xfluential Access Code: [email or web address]  Expires: 9/17/2017  1:49 PM    Xfluential  A secure, online tool to manage your health information     [employer]’s Xfluential® is a secure, online tool that connects you to your personalized health information from the privacy of your home -- day or night - making it very easy for you to manage your healthcare. Once the activation process is completed, you can even access your medical information using the Xfluential jhoana, which is available for free in the Apple Jhoana store or Google Play store.     Xfluential provides the following levels of access (as shown below):   My Chart Features   [facility] Primary Care Doctor [facility]  Specialists [facility]  Urgent  Care Non-[facility]  Primary Care  Doctor   Email your healthcare team securely and privately 24/7 X X X X   Manage appointments: schedule your next appointment; view details of past/upcoming appointments X      Request prescription refills. X      View recent personal medical records, including lab and immunizations X X X X   View health record, including health history, allergies, medications X X X X   Read reports about your outpatient visits, procedures, consult and ER notes X X X X   See your discharge summary, which is a recap of your hospital and/or ER visit that includes your diagnosis, lab results, and care plan. X X       How to register for Xfluential:  1. Go to  [email or web address].  2. Click on the Sign Up Now box, which takes you to the New Member Sign Up page. You will need to provide the following information:  a. Enter your Xfluential Access Code exactly as it appears at the top of this page. (You will not need to use this code after you’ve completed the sign-up process. If you do not sign up before the expiration date, you must request a new code.)   b. Enter your date of birth.   c. Enter your home email address.   d. Click Submit, and follow the next screen’s instructions.  3. Create a Xfluential ID. This will be your [employer]  login ID and cannot be changed, so think of one that is secure and easy to remember.  4. Create a [employer] password. You can change your password at any time.  5. Enter your Password Reset Question and Answer. This can be used at a later time if you forget your password.   6. Enter your e-mail address. This allows you to receive e-mail notifications when new information is available in [employer].  7. Click Sign Up. You can now view your health information.    For assistance activating your [employer] account, call [phone]

## 2017-08-17 NOTE — ED NOTES
53 y/o male bib remsa from home with c/o respiratory distress that began this am. Pt initially 79% on room air, speaking in one word sentences only. Pt has hx of COPD and asthma. Pt out of home medications (resp tx). Pt arrives to ED with respiratory tx in progress, speaking in 2-3 word sentences with pursed lip breathing. Respiratory called, ERP to bedside.

## 2017-08-17 NOTE — FLOWSHEET NOTE
This note also relates to the following rows which could not be included:  Respiration - Cannot attach notes to unvalidated device data  Pulse - Cannot attach notes to unvalidated device data  Heart Rate (Monitored) - Cannot attach notes to unvalidated device data  Pulse Oximetry - Cannot attach notes to unvalidated device data         08/17/17 0956   Events/Summary/Plan   Events/Summary/Plan svn given, pt was taken off bipap by RN   Interdisciplinary Plan of Care-Goals (Indications)   Obstructive Ventilatory Defect or Pulmonary Disease without Obvious Obstruction History / Diagnosis   Interdisciplinary Plan of Care-Outcomes    Bronchodilator Outcome Diminished Wheezing and Volume of Air Movement Increased   Education   Education Yes - Pt. / Family has been Instructed in use of Respiratory Equipment   RT Assessment of Delivered Medications   Evaluation of Medication Delivery Daily Yes-- Pt /Family has been Instructed in use of Respiratory Medications and Adverse Reactions   SVN Group   #SVN Performed Yes   Given By: Mouthpiece   Date SVN Last Changed 08/17/17   Date SVN Next Change Due (Q 7 Days) 08/24/17   Respiratory WDL   Respiratory (WDL) X   Chest Exam   Work Of Breathing / Effort Moderate   Breath Sounds   Pre/Post Intervention Pre Intervention Assessment   RUL Breath Sounds Expiratory Wheezes   RML Breath Sounds Expiratory Wheezes   RLL Breath Sounds Expiratory Wheezes   EDUARDO Breath Sounds Expiratory Wheezes   LLL Breath Sounds Expiratory Wheezes   Secretions   Cough Non Productive   Sputum Color Unable to Evaluate   Oximetry   Continuous Oximetry Yes   Oxygen   O2 (LPM) 2   O2 Daily Delivery Respiratory  Nasal Cannula

## 2017-08-17 NOTE — IP AVS SNAPSHOT
" <p align=\"LEFT\"><IMG SRC=\"//[email or web address]\" alt=\"Image\" WIDTH=\"50%\" HEIGHT=\"200\" BORDER=\"\"></p>                   Name:Osmar Loomis  Medical Record Number:[id number]  CSN: [id number]    YOB: 1965   Age: 52 y.o.  Sex: male  HT:1.676 m (5' 5.98\") WT: 65.4 kg (144 lb 2.9 oz)          Admit Date: 8/17/2017     Discharge Date:   Today's Date: 8/18/2017  Attending Doctor:  GOPAL Conrad*                  Allergies:  n             Medication List      Take these Medications        Instructions    * albuterol 2.5mg/3ml Nebu solution for nebulization   Commonly known as:  PROVENTIL    3 mL by Nebulization route every four hours as needed for Shortness of Breath.   Dose:  2.5 mg       * albuterol 108 (90 Base) MCG/ACT Aers inhalation aerosol    Inhale 2 Puffs by mouth every 2 hours as needed for Shortness of Breath.   Dose:  2 Puff       azithromycin 250 MG Tabs   Commonly known as:  ZITHROMAX    1 daily       fluticasone-salmeterol 250-50 MCG/DOSE Aepb   Commonly known as:  ADVAIR    Inhale 1 Puff by mouth every 12 hours.   Dose:  1 Puff       methylPREDNISolone 4 MG Tabs   Commonly known as:  MEDROL    1 tab 4 times a day for 3 days, 1 tab 3 times a day for 3 days, 1 tab 2 times a day for 3 days, 1 tab daily for 3 days       tiotropium 18 MCG Caps   Commonly known as:  SPIRIVA    Inhale 1 Cap by mouth every day.   Dose:  18 mcg       * Notice:  This list has 2 medication(s) that are the same as other medications prescribed for you. Read the directions carefully, and ask your doctor or other care provider to review them with you.      "

## 2017-08-17 NOTE — PROGRESS NOTES
Two RN skin check completed with ERIC Harden. Skin intact. Slight redness on coccyx, R great toe red. Will continue to monitor

## 2017-08-17 NOTE — ED PROVIDER NOTES
ED Provider Note    CHIEF COMPLAINT  Chief Complaint   Patient presents with   • Respiratory Distress       HPI  Osmar Loomis is a 52 y.o. male who presents in respiratory distress. Patient started getting short of breath yesterday. Ran out of all of his inhalers. Called EMS. Given one albuterol nebulizer. Given 1 DuoNeb prior to arrival. Still significant respiratory difficulty. Barely able to speak. Feels tightness all across his chest. Severe. Nonradiating constant. Has had associated dry cough. No hemoptysis. No leg swelling and leg pain orthopnea or PND.    REVIEW OF SYSTEMS  As per HPI, otherwise a 10 point review of systems is negative    PAST MEDICAL HISTORY  Past Medical History   Diagnosis Date   • Asthma    • COPD (chronic obstructive pulmonary disease) (CMS-[facility])        SOCIAL HISTORY  Social History   Substance Use Topics   • Smoking status: Former Smoker -- 0.02 packs/day     Quit date: 07/17/2017   • Smokeless tobacco: None   • Alcohol Use: No       SURGICAL HISTORY  History reviewed. No pertinent past surgical history.    CURRENT MEDICATIONS  Home Medications     **Home medications have not yet been reviewed for this encounter**          ALLERGIES  Allergies   Allergen Reactions   • Pcn [Penicillins]        PHYSICAL EXAM  VITAL SIGNS: /124 mmHg  Pulse 106  Temp(Src) 36 °C (96.8 °F)  Resp 28  Wt 72.576 kg (160 lb)  SpO2 97%   Constitutional: Awake, respiratory distress, ill appearing  HENT:  Atraumatic, Normocephalic.Oropharynx moist mucus membranes, Nose normal inspection.   Eyes: Normal inspection  Neck: Supple, no JVD  Cardiovascular: Normal heart rate, Normal rhythm.  Symmetric peripheral pulses.   Thorax & Lungs: Diffuse wheezing. No obvious respiratory distress. Tachypnea  Abdomen: Bowel sounds normal, soft, non-distended, nontender, no mass  Skin: Diaphoresis  Back: No tenderness, No CVA tenderness.   Extremities: No clubbing, cyanosis, edema, no Homans or cords   Neurologic:  Grossly normal   Psychiatric: Anxious appearing    RADIOLOGY/PROCEDURES  DX-CHEST-PORTABLE (1 VIEW)   Final Result      7.5 mm nodular density in the left midlung zone. Further evaluation with CT chest is recommended.         Imaging is interpreted by radiologist    Labs:  Results for orders placed or performed during the hospital encounter of 08/17/17   CBC w/ Differential   Result Value Ref Range    WBC 11.0 (H) 4.8 - 10.8 K/uL    RBC 5.12 4.70 - 6.10 M/uL    Hemoglobin 16.2 14.0 - 18.0 g/dL    Hematocrit 48.3 42.0 - 52.0 %    MCV 94.3 81.4 - 97.8 fL    MCH 31.6 27.0 - 33.0 pg    MCHC 33.5 (L) 33.7 - 35.3 g/dL    RDW 43.3 35.9 - 50.0 fL    Platelet Count 249 164 - 446 K/uL    MPV 10.1 9.0 - 12.9 fL    Neutrophils-Polys 66.00 44.00 - 72.00 %    Lymphocytes 17.70 (L) 22.00 - 41.00 %    Monocytes 9.50 0.00 - 13.40 %    Eosinophils 5.70 0.00 - 6.90 %    Basophils 0.80 0.00 - 1.80 %    Immature Granulocytes 0.30 0.00 - 0.90 %    Nucleated RBC 0.00 /100 WBC    Neutrophils (Absolute) 7.25 1.82 - 7.42 K/uL    Lymphs (Absolute) 1.94 1.00 - 4.80 K/uL    Monos (Absolute) 1.04 (H) 0.00 - 0.85 K/uL    Eos (Absolute) 0.62 (H) 0.00 - 0.51 K/uL    Baso (Absolute) 0.09 0.00 - 0.12 K/uL    Immature Granulocytes (abs) 0.03 0.00 - 0.11 K/uL    NRBC (Absolute) 0.00 K/uL   Complete Metabolic Panel (CMP)   Result Value Ref Range    Sodium 138 135 - 145 mmol/L    Potassium 4.5 3.6 - 5.5 mmol/L    Chloride 104 96 - 112 mmol/L    Co2 26 20 - 33 mmol/L    Anion Gap 8.0 0.0 - 11.9    Glucose 85 65 - 99 mg/dL    Bun 8 8 - 22 mg/dL    Creatinine 0.69 0.50 - 1.40 mg/dL    Calcium 9.5 8.5 - 10.5 mg/dL    AST(SGOT) 20 12 - 45 U/L    ALT(SGPT) 14 2 - 50 U/L    Alkaline Phosphatase 63 30 - 99 U/L    Total Bilirubin 0.4 0.1 - 1.5 mg/dL    Albumin 4.2 3.2 - 4.9 g/dL    Total Protein 7.3 6.0 - 8.2 g/dL    Globulin 3.1 1.9 - 3.5 g/dL    A-G Ratio 1.4 g/dL   Btype Natriuretic Peptide   Result Value Ref Range    B Natriuretic Peptide 40 0 - 100 pg/mL    Troponin STAT   Result Value Ref Range    Troponin I <0.01 0.00 - 0.04 ng/mL   Magnesium   Result Value Ref Range    Magnesium 2.1 1.5 - 2.5 mg/dL   Phosphorus   Result Value Ref Range    Phosphorus 4.3 2.5 - 4.5 mg/dL   APTT   Result Value Ref Range    APTT 27.4 24.7 - 36.0 sec   PT/INR   Result Value Ref Range    PT 12.9 12.0 - 14.6 sec    INR 0.94 0.87 - 1.13   ESTIMATED GFR   Result Value Ref Range    GFR If African American >60 >60 mL/min/1.73 m 2    GFR If Non African American >60 >60 mL/min/1.73 m 2   BLOOD CULTURE,HOLD   Result Value Ref Range    Blood Culture Hold Collected    BLOOD CULTURE,HOLD   Result Value Ref Range    Blood Culture Hold Collected    ISTAT ARTERIAL BLOOD GAS   Result Value Ref Range    Ph 7.366 (L) 7.400 - 7.500    Pco2 47.8 (H) 26.0 - 37.0 mmHg    Po2 93 (H) 64 - 87 mmHg    Tco2 29 20 - 33 mmol/L    S02 97 93 - 99 %    Hco3 27.3 (H) 17.0 - 25.0 mmol/L    BE 1 -4 - 3 mmol/L    Body Temp see below degrees    O2 Therapy 40 %    Specimen Arterial          COURSE & MEDICAL DECISION MAKING  Patient presents with acute respiratory distress. I was called emergently to the bedside to evaluate the patient. He is given a 2nd DuoNeb treatment on top of the above treatment. He is placed on BiPAP. I ordered 125 mg of slight Medrol intravenously. 1 L of normal saline was administered. 1 g of magnesium is given. Subsequently placed on continuous a beer all nebulizer treatment. He is observed in the ER and checked on frequently. He is improvement. His laboratory data and x-ray returned as above. I do not suspect infection rather asthma exacerbation. He will need to be admitted to the hospital for further treatment. I was able to titrate him off of BiPAP while in the ER. Hospitalist was consulted for admission at approximately 8:50 AM.    FINAL IMPRESSION  1. Acute hypoxic respiratory failure  2. Asthma with exacerbation  3. BiPAP management    CRITICAL CARE TIME 30 minutes  There was a very real  possibility of deterioration of the patient's condition.  This patient required the highest level of care.  I provided critical care services which included: review of the medical record, treatment orders, ordering and reviewing test results, frequent reevaluation of the patient's condition and response to treatment, as well as discussing the case with appropriate personnel and various consultants. The critical care time associated with the care of this patient is exclusive of any procedures or specific interventions.        This dictation was created using voice recognition software. The accuracy of the dictation is limited to the abilities of the software.  The nursing notes were reviewed and certain aspects of this information were incorporated into this note.      Electronically signed by: Faisal Awad, 8/17/2017 8:47 AM

## 2017-08-18 ENCOUNTER — APPOINTMENT (OUTPATIENT)
Dept: RADIOLOGY | Facility: MEDICAL CENTER | Age: 52
DRG: 189 | End: 2017-08-18
Attending: INTERNAL MEDICINE
Payer: MEDICAID

## 2017-08-18 VITALS
HEIGHT: 66 IN | BODY MASS INDEX: 23.17 KG/M2 | SYSTOLIC BLOOD PRESSURE: 137 MMHG | HEART RATE: 85 BPM | WEIGHT: 144.18 LBS | RESPIRATION RATE: 18 BRPM | OXYGEN SATURATION: 90 % | DIASTOLIC BLOOD PRESSURE: 78 MMHG | TEMPERATURE: 98.3 F

## 2017-08-18 PROBLEM — K76.0 FATTY LIVER: Status: ACTIVE | Noted: 2017-08-18

## 2017-08-18 PROBLEM — R91.8 LUNG MASS: Status: ACTIVE | Noted: 2017-08-18

## 2017-08-18 LAB
ANION GAP SERPL CALC-SCNC: 9 MMOL/L (ref 0–11.9)
BASOPHILS # BLD AUTO: 0.3 % (ref 0–1.8)
BASOPHILS # BLD: 0.06 K/UL (ref 0–0.12)
BUN SERPL-MCNC: 12 MG/DL (ref 8–22)
CALCIUM SERPL-MCNC: 9.2 MG/DL (ref 8.5–10.5)
CHLORIDE SERPL-SCNC: 104 MMOL/L (ref 96–112)
CO2 SERPL-SCNC: 23 MMOL/L (ref 20–33)
CREAT SERPL-MCNC: 0.59 MG/DL (ref 0.5–1.4)
EOSINOPHIL # BLD AUTO: 0 K/UL (ref 0–0.51)
EOSINOPHIL NFR BLD: 0 % (ref 0–6.9)
ERYTHROCYTE [DISTWIDTH] IN BLOOD BY AUTOMATED COUNT: 41.4 FL (ref 35.9–50)
GFR SERPL CREATININE-BSD FRML MDRD: >60 ML/MIN/1.73 M 2
GLUCOSE SERPL-MCNC: 173 MG/DL (ref 65–99)
HCT VFR BLD AUTO: 43.2 % (ref 42–52)
HGB BLD-MCNC: 14.6 G/DL (ref 14–18)
IMM GRANULOCYTES # BLD AUTO: 0.13 K/UL (ref 0–0.11)
IMM GRANULOCYTES NFR BLD AUTO: 0.7 % (ref 0–0.9)
LYMPHOCYTES # BLD AUTO: 0.86 K/UL (ref 1–4.8)
LYMPHOCYTES NFR BLD: 4.4 % (ref 22–41)
MCH RBC QN AUTO: 30.8 PG (ref 27–33)
MCHC RBC AUTO-ENTMCNC: 33.8 G/DL (ref 33.7–35.3)
MCV RBC AUTO: 91.1 FL (ref 81.4–97.8)
MONOCYTES # BLD AUTO: 0.33 K/UL (ref 0–0.85)
MONOCYTES NFR BLD AUTO: 1.7 % (ref 0–13.4)
NEUTROPHILS # BLD AUTO: 18.26 K/UL (ref 1.82–7.42)
NEUTROPHILS NFR BLD: 92.9 % (ref 44–72)
NRBC # BLD AUTO: 0 K/UL
NRBC BLD AUTO-RTO: 0 /100 WBC
PLATELET # BLD AUTO: 363 K/UL (ref 164–446)
PMV BLD AUTO: 9.9 FL (ref 9–12.9)
POTASSIUM SERPL-SCNC: 4.7 MMOL/L (ref 3.6–5.5)
RBC # BLD AUTO: 4.74 M/UL (ref 4.7–6.1)
SODIUM SERPL-SCNC: 136 MMOL/L (ref 135–145)
WBC # BLD AUTO: 19.6 K/UL (ref 4.8–10.8)

## 2017-08-18 PROCEDURE — 99239 HOSP IP/OBS DSCHRG MGMT >30: CPT | Performed by: INTERNAL MEDICINE

## 2017-08-18 PROCEDURE — 80048 BASIC METABOLIC PNL TOTAL CA: CPT

## 2017-08-18 PROCEDURE — 700102 HCHG RX REV CODE 250 W/ 637 OVERRIDE(OP): Performed by: INTERNAL MEDICINE

## 2017-08-18 PROCEDURE — 700111 HCHG RX REV CODE 636 W/ 250 OVERRIDE (IP): Performed by: INTERNAL MEDICINE

## 2017-08-18 PROCEDURE — 36415 COLL VENOUS BLD VENIPUNCTURE: CPT

## 2017-08-18 PROCEDURE — A9270 NON-COVERED ITEM OR SERVICE: HCPCS | Performed by: INTERNAL MEDICINE

## 2017-08-18 PROCEDURE — 85025 COMPLETE CBC W/AUTO DIFF WBC: CPT

## 2017-08-18 PROCEDURE — 700117 HCHG RX CONTRAST REV CODE 255: Performed by: INTERNAL MEDICINE

## 2017-08-18 PROCEDURE — 71260 CT THORAX DX C+: CPT

## 2017-08-18 PROCEDURE — 700101 HCHG RX REV CODE 250: Performed by: INTERNAL MEDICINE

## 2017-08-18 PROCEDURE — 94640 AIRWAY INHALATION TREATMENT: CPT

## 2017-08-18 RX ORDER — ALBUTEROL SULFATE 2.5 MG/3ML
2.5 SOLUTION RESPIRATORY (INHALATION) EVERY 4 HOURS PRN
Qty: 60 BULLET | Refills: 12 | Status: SHIPPED | OUTPATIENT
Start: 2017-08-18

## 2017-08-18 RX ORDER — ALBUTEROL SULFATE 90 UG/1
2 AEROSOL, METERED RESPIRATORY (INHALATION)
Qty: 1 INHALER | Refills: 5 | Status: SHIPPED | OUTPATIENT
Start: 2017-08-18

## 2017-08-18 RX ORDER — METHYLPREDNISOLONE 4 MG/1
TABLET ORAL
Qty: 30 TAB | Refills: 0 | Status: SHIPPED | OUTPATIENT
Start: 2017-08-18

## 2017-08-18 RX ORDER — AZITHROMYCIN 250 MG/1
TABLET, FILM COATED ORAL
Qty: 4 TAB | Refills: 0 | Status: SHIPPED | OUTPATIENT
Start: 2017-08-18

## 2017-08-18 RX ORDER — TIOTROPIUM BROMIDE 18 UG/1
18 CAPSULE ORAL; RESPIRATORY (INHALATION) DAILY
Qty: 30 CAP | Refills: 3 | Status: SHIPPED | OUTPATIENT
Start: 2017-08-18

## 2017-08-18 RX ADMIN — METHYLPREDNISOLONE SODIUM SUCCINATE 60 MG: 125 INJECTION, POWDER, FOR SOLUTION INTRAMUSCULAR; INTRAVENOUS at 06:00

## 2017-08-18 RX ADMIN — IOHEXOL 75 ML: 350 INJECTION, SOLUTION INTRAVENOUS at 11:41

## 2017-08-18 RX ADMIN — IPRATROPIUM BROMIDE AND ALBUTEROL SULFATE 3 ML: .5; 3 SOLUTION RESPIRATORY (INHALATION) at 10:57

## 2017-08-18 RX ADMIN — AZITHROMYCIN 250 MG: 250 TABLET, FILM COATED ORAL at 08:34

## 2017-08-18 RX ADMIN — METHYLPREDNISOLONE SODIUM SUCCINATE 60 MG: 125 INJECTION, POWDER, FOR SOLUTION INTRAMUSCULAR; INTRAVENOUS at 13:22

## 2017-08-18 RX ADMIN — IPRATROPIUM BROMIDE AND ALBUTEROL SULFATE 3 ML: .5; 3 SOLUTION RESPIRATORY (INHALATION) at 07:20

## 2017-08-18 RX ADMIN — STANDARDIZED SENNA CONCENTRATE AND DOCUSATE SODIUM 2 TABLET: 8.6; 5 TABLET, FILM COATED ORAL at 08:33

## 2017-08-18 RX ADMIN — NICOTINE 14 MG: 14 PATCH, EXTENDED RELEASE TRANSDERMAL at 05:20

## 2017-08-18 RX ADMIN — METHYLPREDNISOLONE SODIUM SUCCINATE 60 MG: 125 INJECTION, POWDER, FOR SOLUTION INTRAMUSCULAR; INTRAVENOUS at 00:00

## 2017-08-18 RX ADMIN — HEPARIN SODIUM 5000 UNITS: 5000 INJECTION, SOLUTION INTRAVENOUS; SUBCUTANEOUS at 05:21

## 2017-08-18 RX ADMIN — BUDESONIDE AND FORMOTEROL FUMARATE DIHYDRATE 2 PUFF: 80; 4.5 AEROSOL RESPIRATORY (INHALATION) at 07:20

## 2017-08-18 ASSESSMENT — PAIN SCALES - GENERAL: PAINLEVEL_OUTOF10: 0

## 2017-08-18 NOTE — FLOWSHEET NOTE
08/18/17 0721   Events/Summary/Plan   Events/Summary/Plan SVN done via m/p f/b MDI.  Pt. tolerated tx well.   Interdisciplinary Plan of Care-Goals (Indications)   Obstructive Ventilatory Defect or Pulmonary Disease without Obvious Obstruction History / Diagnosis;Physical Exam / Hyperinflation / Wheezing (bronchospasm);Strong Subjective / Objective Improvement   Interdisciplinary Plan of Care-Outcomes    Bronchodilator Outcome Diminished Wheezing and Volume of Air Movement Increased;Improved Vital Signs and Measures of Gas Exchange   Bronchopulmonary Hygiene Outcome Improvement in Vital Signs and Measures of Gas Exchange;Patient at Stable Baseline   Education   Education Yes - Pt. / Family has been Instructed in use of Respiratory Equipment;Yes - Pt. / Family has been Instructed in use of Respiratory Medications and Adverse Reactions   RT Assessment of Delivered Medications   Evaluation of Medication Delivery Daily Yes-- Pt /Family has been Instructed in use of Respiratory Medications and Adverse Reactions   SVN Group   #SVN Performed Yes   Given By: Mouthpiece   Date SVN Last Changed 08/17/17   Date SVN Next Change Due (Q 7 Days) 08/24/17   MDI/DPI Group   #MDI/DPI Given MDI/DPI x 1   Respiratory WDL   Respiratory (WDL) X   Chest Exam   Work Of Breathing / Effort Mild;Moderate   Respiration 20   Pulse 86   Heart Rate (Monitored) 85   Breath Sounds   Pre/Post Intervention Pre Intervention Assessment   RUL Breath Sounds Expiratory Wheezes;Fine Crackles   RML Breath Sounds Expiratory Wheezes   RLL Breath Sounds Diminished   EDUARDO Breath Sounds Expiratory Wheezes;Fine Crackles   LLL Breath Sounds Diminished   Secretions   Cough Moist;Non Productive   How Sputum Obtained Cough on Request;Spontaneous   Sputum Amount Unable to Evaluate   Sputum Color Unable to Evaluate   Oximetry   Continuous Oximetry Yes   Oxygen   Pulse Oximetry 94 %   O2 (LPM) 2   O2 Daily Delivery Respiratory  Silicone Nasal Cannula

## 2017-08-18 NOTE — PROGRESS NOTES
Patient is sleeping comfortably in bed, no signs of distress, even unlabored breathing. Satting above 95% on 2 L, will continue to monitor.

## 2017-08-18 NOTE — PROGRESS NOTES
Report received at bedside, assumed care. Pt is resting in bed. A&O x 4. Declines pain at this time. On 2 L nasal canula satting about 95%. No other concerns, complains or distress. Tele box on. Chart reviewed. Bed in lowest position, treaded slipper sock on, and call light within reach.

## 2017-08-18 NOTE — CARE PLAN
Problem: Bronchoconstriction:  Goal: Improve in air movement and diminished wheezing  Outcome: PROGRESSING AS EXPECTED  Respiratory Therapy Update    Interdisciplinary Plan of Care-Goals (Indications)  Obstructive Ventilatory Defect or Pulmonary Disease without Obvious Obstruction: History / Diagnosis (08/17/17 1434)  Interdisciplinary Plan of Care-Outcomes   Bronchodilator Outcome: Diminished Wheezing and Volume of Air Movement Increased (08/17/17 1434)    #SVN Performed: Yes (08/17/17 1434)    Cough: Non Productive (08/17/17 1434)     Sputum Color: Unable to Evaluate (08/17/17 0956)     O2 (FiO2): 40 (08/17/17 0754)  O2 (LPM): 2 (08/17/17 1600)  O2 Daily Delivery Respiratory : Silicone Nasal Cannula (08/17/17 1434)    Breath Sounds  Pre/Post Intervention: Pre Intervention Assessment (08/17/17 1434)  RUL Breath Sounds: Expiratory Wheezes (08/17/17 1434)  RML Breath Sounds: Expiratory Wheezes (08/17/17 1434)  RLL Breath Sounds: Expiratory Wheezes (08/17/17 1434)  EDUARDO Breath Sounds: Expiratory Wheezes (08/17/17 1434)  LLL Breath Sounds: Expiratory Wheezes (08/17/17 1434)    Events/Summary/Plan: SVN given (08/17/17 1434)

## 2017-08-18 NOTE — DISCHARGE PLANNING
Medical Social Work    Referral: Rx(s)    Intervention: Roberto faxed rx for Advair to pt's pharmacy, [facility], requesting co-pay amount.    Plan: Await response from pt's pharmacy and verify prior authorization is not required.

## 2017-08-18 NOTE — CARE PLAN
Problem: Safety  Goal: Will remain free from falls  Intervention: Assess risk factors for falls  Educated patient on use of call light, no slip socks on, bed lowest position. All needs attended to. Patient verbalized understanding.           Problem: Infection  Goal: Will remain free from infection  Intervention: Implement standard precautions and perform hand washing before and after patient contact  Educated patient about hand hygiene. Verbalized understanding and can demonstrate effectively. Will continue to education patient about infection precautions and monitor for signs and symptoms of infection.

## 2017-08-19 NOTE — DISCHARGE SUMMARY
CHIEF COMPLAINT ON ADMISSION  Chief Complaint   Patient presents with   • Respiratory Distress       CODE STATUS  Prior    HPI & HOSPITAL COURSE  This is a 52 y.o. Male, who quit smoking 1 week prior to admission, past medical history of COPD not on oxygen- had not been on Symbicort due to his insurance no longer covering the medication here with worsening shortness of breath, he was so short of breath in the emergency room that he required BiPAP, shortly thereafter he was able to be weaned off BiPAP but was still hypoxic and short of breath. Patient was aggressively managed with respiratory therapy protocol, and sooner than expected he recovered and was able to be weaned off of his oxygen. We verified that Advair is covered on his formulary and his inhaled corticosteroid with subsequently changed to this medication. Patient is moving out of state to [place] and was anxious for discharge as he was no longer having respiratory difficulty was ambulating in the [place] without significant desaturations it was felt reasonable to discharge him at this time as stated above when patient presented he was in extremis and it was anticipated that he would be here 2-3 days however he recovered well due to the treatment he received and was discharged sooner than anticipated    Therefore, he is discharged in good and stable condition with close outpatient follow-up.    SPECIFIC OUTPATIENT FOLLOW-UP  Primary care clinic    DISCHARGE PROBLEM LIST  Principal Problem:    Acute respiratory failure with hypoxia and hypercapnia (CMS-HCC) POA: Yes  Active Problems:    Hypoxemia POA: Yes    COPD exacerbation (CMS-HCC) POA: Yes    Tachycardia POA: Yes    Leukocytosis POA: Yes    Lung mass POA: Yes    Fatty liver POA: Unknown  Resolved Problems:    * No resolved hospital problems. *        MEDICATIONS ON DISCHARGE   Osmar Loomis   Home Medication Instructions LEENA:[id number]    Printed on:08/19/17 2587   Medication Information                       albuterol (PROVENTIL) 2.5mg/3ml Nebu Soln solution for nebulization  3 mL by Nebulization route every four hours as needed for Shortness of Breath.             albuterol 108 (90 Base) MCG/ACT Aero Soln inhalation aerosol  Inhale 2 Puffs by mouth every 2 hours as needed for Shortness of Breath.             azithromycin (ZITHROMAX) 250 MG Tab  1 daily             fluticasone-salmeterol (ADVAIR) 250-50 MCG/DOSE AEROSOL POWDER, BREATH ACTIVATED  Inhale 1 Puff by mouth every 12 hours.             methylPREDNISolone (MEDROL) 4 MG Tab  1 tab 4 times a day for 3 days, 1 tab 3 times a day for 3 days, 1 tab 2 times a day for 3 days, 1 tab daily for 3 days             tiotropium (SPIRIVA) 18 MCG Cap  Inhale 1 Cap by mouth every day.                 DIET  Regular    ACTIVITY  As tolerated      CONSULTATIONS  none    PROCEDURES  none    LABORATORY  Lab Results   Component Value Date/Time    SODIUM 136 08/18/2017 02:17 AM    POTASSIUM 4.7 08/18/2017 02:17 AM    CHLORIDE 104 08/18/2017 02:17 AM    CO2 23 08/18/2017 02:17 AM    GLUCOSE 173* 08/18/2017 02:17 AM    BUN 12 08/18/2017 02:17 AM    CREATININE 0.59 08/18/2017 02:17 AM        Lab Results   Component Value Date/Time    WBC 19.6* 08/18/2017 02:17 AM    HEMOGLOBIN 14.6 08/18/2017 02:17 AM    HEMATOCRIT 43.2 08/18/2017 02:17 AM    PLATELET COUNT 363 08/18/2017 02:17 AM        Total time of the discharge process exceeds 36 minutes

## 2017-08-27 LAB — EKG IMPRESSION: NORMAL

## 2018-02-02 ENCOUNTER — HOSPITAL ENCOUNTER (EMERGENCY)
Dept: HOSPITAL 8 - ED | Age: 53
Discharge: HOME | End: 2018-02-02
Payer: MEDICAID

## 2018-02-02 VITALS — HEIGHT: 66 IN | WEIGHT: 160.94 LBS | BODY MASS INDEX: 25.86 KG/M2

## 2018-02-02 VITALS — DIASTOLIC BLOOD PRESSURE: 80 MMHG | SYSTOLIC BLOOD PRESSURE: 130 MMHG

## 2018-02-02 DIAGNOSIS — J44.9: ICD-10-CM

## 2018-02-02 DIAGNOSIS — L03.116: Primary | ICD-10-CM

## 2018-02-02 LAB
ALBUMIN SERPL-MCNC: 3.2 G/DL (ref 3.4–5)
ANION GAP SERPL CALC-SCNC: 6 MMOL/L (ref 5–15)
BASOPHILS # BLD AUTO: 0.05 X10^3/UL (ref 0–0.1)
BASOPHILS NFR BLD AUTO: 1 % (ref 0–1)
CALCIUM SERPL-MCNC: 9 MG/DL (ref 8.5–10.1)
CHLORIDE SERPL-SCNC: 104 MMOL/L (ref 98–107)
CREAT SERPL-MCNC: 0.77 MG/DL (ref 0.7–1.3)
CULTURE INDICATED?: NO
EOSINOPHIL # BLD AUTO: 0.65 X10^3/UL (ref 0–0.4)
EOSINOPHIL NFR BLD AUTO: 8 % (ref 1–7)
ERYTHROCYTE [DISTWIDTH] IN BLOOD BY AUTOMATED COUNT: 13.6 % (ref 9.4–14.8)
ERYTHROCYTE [SEDIMENTATION RATE] IN BLOOD BY WESTERGREN METHOD: 22 MM/HR (ref 0–10)
HCT (SEDRATE): 47.2 % (ref 39.2–51.8)
LYMPHOCYTES # BLD AUTO: 1.74 X10^3/UL (ref 1–3.4)
LYMPHOCYTES NFR BLD AUTO: 21 % (ref 22–44)
MCH RBC QN AUTO: 30.3 PG (ref 27.5–34.5)
MCHC RBC AUTO-ENTMCNC: 32.6 G/DL (ref 33.2–36.2)
MCV RBC AUTO: 92.8 FL (ref 81–97)
MD: NO
MICROSCOPIC: (no result)
MONOCYTES # BLD AUTO: 1.22 X10^3/UL (ref 0.2–0.8)
MONOCYTES NFR BLD AUTO: 15 % (ref 2–9)
NEUTROPHILS # BLD AUTO: 4.52 X10^3/UL (ref 1.8–6.8)
NEUTROPHILS NFR BLD AUTO: 55 % (ref 42–75)
PLATELET # BLD AUTO: 350 X10^3/UL (ref 130–400)
PMV BLD AUTO: 8 FL (ref 7.4–10.4)
RBC # BLD AUTO: 5.09 X10^6/UL (ref 4.38–5.82)

## 2018-02-02 PROCEDURE — 96366 THER/PROPH/DIAG IV INF ADDON: CPT

## 2018-02-02 PROCEDURE — 90471 IMMUNIZATION ADMIN: CPT

## 2018-02-02 PROCEDURE — 87040 BLOOD CULTURE FOR BACTERIA: CPT

## 2018-02-02 PROCEDURE — 96375 TX/PRO/DX INJ NEW DRUG ADDON: CPT

## 2018-02-02 PROCEDURE — 80048 BASIC METABOLIC PNL TOTAL CA: CPT

## 2018-02-02 PROCEDURE — 73630 X-RAY EXAM OF FOOT: CPT

## 2018-02-02 PROCEDURE — 76881 US COMPL JOINT R-T W/IMG: CPT

## 2018-02-02 PROCEDURE — 81003 URINALYSIS AUTO W/O SCOPE: CPT

## 2018-02-02 PROCEDURE — 99285 EMERGENCY DEPT VISIT HI MDM: CPT

## 2018-02-02 PROCEDURE — 96361 HYDRATE IV INFUSION ADD-ON: CPT

## 2018-02-02 PROCEDURE — 82040 ASSAY OF SERUM ALBUMIN: CPT

## 2018-02-02 PROCEDURE — 96365 THER/PROPH/DIAG IV INF INIT: CPT

## 2018-02-02 PROCEDURE — 85025 COMPLETE CBC W/AUTO DIFF WBC: CPT

## 2018-02-02 PROCEDURE — 85651 RBC SED RATE NONAUTOMATED: CPT

## 2018-02-02 PROCEDURE — 36415 COLL VENOUS BLD VENIPUNCTURE: CPT

## 2018-02-02 PROCEDURE — 90715 TDAP VACCINE 7 YRS/> IM: CPT

## 2018-02-26 ENCOUNTER — HOSPITAL ENCOUNTER (EMERGENCY)
Dept: HOSPITAL 8 - ED | Age: 53
Discharge: HOME | End: 2018-02-26
Payer: MEDICAID

## 2018-02-26 VITALS — HEIGHT: 67 IN | WEIGHT: 138.89 LBS | BODY MASS INDEX: 21.8 KG/M2

## 2018-02-26 VITALS — SYSTOLIC BLOOD PRESSURE: 130 MMHG | DIASTOLIC BLOOD PRESSURE: 92 MMHG

## 2018-02-26 DIAGNOSIS — J44.9: ICD-10-CM

## 2018-02-26 DIAGNOSIS — L03.116: Primary | ICD-10-CM

## 2018-02-26 PROCEDURE — 99281 EMR DPT VST MAYX REQ PHY/QHP: CPT

## 2018-06-20 ENCOUNTER — HOSPITAL ENCOUNTER (EMERGENCY)
Dept: HOSPITAL 8 - ED | Age: 53
Discharge: HOME | End: 2018-06-20
Payer: MEDICAID

## 2018-06-20 VITALS — HEIGHT: 67 IN | WEIGHT: 146.61 LBS | BODY MASS INDEX: 23.01 KG/M2

## 2018-06-20 VITALS — SYSTOLIC BLOOD PRESSURE: 111 MMHG | DIASTOLIC BLOOD PRESSURE: 77 MMHG

## 2018-06-20 DIAGNOSIS — Z87.891: ICD-10-CM

## 2018-06-20 DIAGNOSIS — J44.9: ICD-10-CM

## 2018-06-20 DIAGNOSIS — J45.41: Primary | ICD-10-CM

## 2018-06-20 PROCEDURE — 93005 ELECTROCARDIOGRAM TRACING: CPT

## 2018-06-20 PROCEDURE — 94640 AIRWAY INHALATION TREATMENT: CPT

## 2018-06-20 PROCEDURE — 71046 X-RAY EXAM CHEST 2 VIEWS: CPT

## 2018-06-20 PROCEDURE — 99284 EMERGENCY DEPT VISIT MOD MDM: CPT

## 2018-07-07 ENCOUNTER — HOSPITAL ENCOUNTER (EMERGENCY)
Dept: HOSPITAL 8 - ED | Age: 53
LOS: 1 days | Discharge: HOME | End: 2018-07-08
Payer: MEDICAID

## 2018-07-07 VITALS — HEIGHT: 70 IN | BODY MASS INDEX: 22.09 KG/M2 | WEIGHT: 154.32 LBS

## 2018-07-07 DIAGNOSIS — F15.20: Primary | ICD-10-CM

## 2018-07-07 DIAGNOSIS — Z72.9: ICD-10-CM

## 2018-07-07 DIAGNOSIS — F19.94: ICD-10-CM

## 2018-07-07 DIAGNOSIS — Z63.8: ICD-10-CM

## 2018-07-07 DIAGNOSIS — F41.9: ICD-10-CM

## 2018-07-07 DIAGNOSIS — Z75.9: ICD-10-CM

## 2018-07-07 DIAGNOSIS — J44.9: ICD-10-CM

## 2018-07-07 PROCEDURE — 99284 EMERGENCY DEPT VISIT MOD MDM: CPT

## 2018-07-07 PROCEDURE — 82040 ASSAY OF SERUM ALBUMIN: CPT

## 2018-07-07 PROCEDURE — 80048 BASIC METABOLIC PNL TOTAL CA: CPT

## 2018-07-07 PROCEDURE — 85025 COMPLETE CBC W/AUTO DIFF WBC: CPT

## 2018-07-07 PROCEDURE — 96372 THER/PROPH/DIAG INJ SC/IM: CPT

## 2018-07-07 PROCEDURE — 80307 DRUG TEST PRSMV CHEM ANLYZR: CPT

## 2018-07-07 PROCEDURE — 80329 ANALGESICS NON-OPIOID 1 OR 2: CPT

## 2018-07-07 PROCEDURE — G0480 DRUG TEST DEF 1-7 CLASSES: HCPCS

## 2018-07-07 PROCEDURE — 36415 COLL VENOUS BLD VENIPUNCTURE: CPT

## 2018-07-07 SDOH — SOCIAL STABILITY - SOCIAL INSECURITY: OTHER SPECIFIED PROBLEMS RELATED TO PRIMARY SUPPORT GROUP: Z63.8

## 2018-07-08 VITALS — DIASTOLIC BLOOD PRESSURE: 74 MMHG | SYSTOLIC BLOOD PRESSURE: 138 MMHG

## 2018-07-08 LAB
ALBUMIN SERPL-MCNC: 3.7 G/DL (ref 3.4–5)
ANION GAP SERPL CALC-SCNC: 9 MMOL/L (ref 5–15)
APAP SERPL-MCNC: < 2 MCG/ML (ref 10–30)
BASOPHILS # BLD AUTO: 0.07 X10^3/UL (ref 0–0.1)
BASOPHILS NFR BLD AUTO: 1 % (ref 0–1)
CALCIUM SERPL-MCNC: 9 MG/DL (ref 8.5–10.1)
CHLORIDE SERPL-SCNC: 106 MMOL/L (ref 98–107)
CREAT SERPL-MCNC: 0.95 MG/DL (ref 0.7–1.3)
EOSINOPHIL # BLD AUTO: 0.01 X10^3/UL (ref 0–0.4)
EOSINOPHIL NFR BLD AUTO: 0 % (ref 1–7)
ERYTHROCYTE [DISTWIDTH] IN BLOOD BY AUTOMATED COUNT: 13.6 % (ref 9.4–14.8)
LYMPHOCYTES # BLD AUTO: 0.79 X10^3/UL (ref 1–3.4)
LYMPHOCYTES NFR BLD AUTO: 7 % (ref 22–44)
MCH RBC QN AUTO: 31.3 PG (ref 27.5–34.5)
MCHC RBC AUTO-ENTMCNC: 34 G/DL (ref 33.2–36.2)
MCV RBC AUTO: 91.9 FL (ref 81–97)
MD: (no result)
MONOCYTES # BLD AUTO: 0.45 X10^3/UL (ref 0.2–0.8)
MONOCYTES NFR BLD AUTO: 4 % (ref 2–9)
NEUTROPHILS # BLD AUTO: 9.92 X10^3/UL (ref 1.8–6.8)
NEUTROPHILS NFR BLD AUTO: 88 % (ref 42–75)
PLATELET # BLD AUTO: 387 X10^3/UL (ref 130–400)
PMV BLD AUTO: 8.4 FL (ref 7.4–10.4)
RBC # BLD AUTO: 5.3 X10^6/UL (ref 4.38–5.82)
VANCOMYCIN TROUGH SERPL-MCNC: 4.6 MG/DL (ref 2.8–20)

## 2019-03-16 ENCOUNTER — HOSPITAL ENCOUNTER (EMERGENCY)
Facility: MEDICAL CENTER | Age: 54
End: 2019-03-17
Attending: EMERGENCY MEDICINE
Payer: MEDICAID

## 2019-03-16 ENCOUNTER — APPOINTMENT (OUTPATIENT)
Dept: RADIOLOGY | Facility: MEDICAL CENTER | Age: 54
End: 2019-03-16
Attending: EMERGENCY MEDICINE
Payer: MEDICAID

## 2019-03-16 DIAGNOSIS — R10.9 ABDOMINAL CRAMPING: ICD-10-CM

## 2019-03-16 DIAGNOSIS — R19.7 NAUSEA VOMITING AND DIARRHEA: ICD-10-CM

## 2019-03-16 DIAGNOSIS — R11.2 NAUSEA VOMITING AND DIARRHEA: ICD-10-CM

## 2019-03-16 PROCEDURE — 74177 CT ABD & PELVIS W/CONTRAST: CPT | Performed by: RADIOLOGY

## 2019-03-16 PROCEDURE — 99284 EMERGENCY DEPT VISIT MOD MDM: CPT

## 2019-03-16 PROCEDURE — 700117 HCHG RX CONTRAST REV CODE 255: Performed by: EMERGENCY MEDICINE

## 2019-03-16 PROCEDURE — 74177 CT ABD & PELVIS W/CONTRAST: CPT

## 2019-03-16 PROCEDURE — 96372 THER/PROPH/DIAG INJ SC/IM: CPT

## 2019-03-16 PROCEDURE — 96374 THER/PROPH/DIAG INJ IV PUSH: CPT

## 2019-03-16 RX ORDER — METOCLOPRAMIDE HYDROCHLORIDE 5 MG/ML
10 INJECTION INTRAMUSCULAR; INTRAVENOUS ONCE
Status: COMPLETED | OUTPATIENT
Start: 2019-03-17 | End: 2019-03-17

## 2019-03-16 RX ORDER — DICYCLOMINE HYDROCHLORIDE 10 MG/ML
20 INJECTION INTRAMUSCULAR ONCE
Status: COMPLETED | OUTPATIENT
Start: 2019-03-17 | End: 2019-03-17

## 2019-03-16 RX ORDER — SODIUM CHLORIDE 9 MG/ML
1000 INJECTION, SOLUTION INTRAVENOUS ONCE
Status: COMPLETED | OUTPATIENT
Start: 2019-03-17 | End: 2019-03-17

## 2019-03-16 RX ADMIN — IOHEXOL 100 ML: 350 INJECTION, SOLUTION INTRAVENOUS at 23:58

## 2019-03-17 VITALS
RESPIRATION RATE: 18 BRPM | HEIGHT: 66 IN | BODY MASS INDEX: 22.78 KG/M2 | SYSTOLIC BLOOD PRESSURE: 125 MMHG | DIASTOLIC BLOOD PRESSURE: 75 MMHG | WEIGHT: 141.76 LBS | HEART RATE: 77 BPM | OXYGEN SATURATION: 98 % | TEMPERATURE: 98 F

## 2019-03-17 LAB
ALBUMIN SERPL BCP-MCNC: 4.4 G/DL (ref 3.2–4.9)
ALBUMIN/GLOB SERPL: 1.3 G/DL
ALP SERPL-CCNC: 55 U/L (ref 30–99)
ALT SERPL-CCNC: 16 U/L (ref 2–50)
ANION GAP SERPL CALC-SCNC: 11 MMOL/L (ref 0–11.9)
AST SERPL-CCNC: 20 U/L (ref 12–45)
BASOPHILS # BLD AUTO: 1.1 % (ref 0–1.8)
BASOPHILS # BLD: 0.1 K/UL (ref 0–0.12)
BILIRUB SERPL-MCNC: 0.9 MG/DL (ref 0.1–1.5)
BUN SERPL-MCNC: 16 MG/DL (ref 8–22)
CALCIUM SERPL-MCNC: 8.9 MG/DL (ref 8.5–10.5)
CHLORIDE SERPL-SCNC: 101 MMOL/L (ref 96–112)
CO2 SERPL-SCNC: 24 MMOL/L (ref 20–33)
CREAT SERPL-MCNC: 0.72 MG/DL (ref 0.5–1.4)
EOSINOPHIL # BLD AUTO: 0.04 K/UL (ref 0–0.51)
EOSINOPHIL NFR BLD: 0.5 % (ref 0–6.9)
ERYTHROCYTE [DISTWIDTH] IN BLOOD BY AUTOMATED COUNT: 41.6 FL (ref 35.9–50)
GLOBULIN SER CALC-MCNC: 3.4 G/DL (ref 1.9–3.5)
GLUCOSE SERPL-MCNC: 121 MG/DL (ref 65–99)
HCT VFR BLD AUTO: 45.4 % (ref 42–52)
HGB BLD-MCNC: 15.6 G/DL (ref 14–18)
IMM GRANULOCYTES # BLD AUTO: 0.04 K/UL (ref 0–0.11)
IMM GRANULOCYTES NFR BLD AUTO: 0.5 % (ref 0–0.9)
LIPASE SERPL-CCNC: 20 U/L (ref 11–82)
LYMPHOCYTES # BLD AUTO: 1.37 K/UL (ref 1–4.8)
LYMPHOCYTES NFR BLD: 15.7 % (ref 22–41)
MCH RBC QN AUTO: 31.1 PG (ref 27–33)
MCHC RBC AUTO-ENTMCNC: 34.4 G/DL (ref 33.7–35.3)
MCV RBC AUTO: 90.4 FL (ref 81.4–97.8)
MONOCYTES # BLD AUTO: 0.45 K/UL (ref 0–0.85)
MONOCYTES NFR BLD AUTO: 5.2 % (ref 0–13.4)
NEUTROPHILS # BLD AUTO: 6.71 K/UL (ref 1.82–7.42)
NEUTROPHILS NFR BLD: 77 % (ref 44–72)
NRBC # BLD AUTO: 0 K/UL
NRBC BLD-RTO: 0 /100 WBC
PLATELET # BLD AUTO: 381 K/UL (ref 164–446)
PMV BLD AUTO: 9.2 FL (ref 9–12.9)
POTASSIUM SERPL-SCNC: 3.9 MMOL/L (ref 3.6–5.5)
PROT SERPL-MCNC: 7.8 G/DL (ref 6–8.2)
RBC # BLD AUTO: 5.02 M/UL (ref 4.7–6.1)
SODIUM SERPL-SCNC: 136 MMOL/L (ref 135–145)
WBC # BLD AUTO: 8.7 K/UL (ref 4.8–10.8)

## 2019-03-17 PROCEDURE — 700111 HCHG RX REV CODE 636 W/ 250 OVERRIDE (IP): Performed by: EMERGENCY MEDICINE

## 2019-03-17 PROCEDURE — 80053 COMPREHEN METABOLIC PANEL: CPT

## 2019-03-17 PROCEDURE — 83690 ASSAY OF LIPASE: CPT

## 2019-03-17 PROCEDURE — 85025 COMPLETE CBC W/AUTO DIFF WBC: CPT

## 2019-03-17 PROCEDURE — 700105 HCHG RX REV CODE 258: Performed by: EMERGENCY MEDICINE

## 2019-03-17 RX ORDER — ONDANSETRON 4 MG/1
4 TABLET, ORALLY DISINTEGRATING ORAL EVERY 6 HOURS PRN
Qty: 8 TAB | Refills: 0 | Status: SHIPPED | OUTPATIENT
Start: 2019-03-17

## 2019-03-17 RX ORDER — DICYCLOMINE HCL 20 MG
20 TABLET ORAL EVERY 6 HOURS
Qty: 20 TAB | Refills: 0 | Status: SHIPPED | OUTPATIENT
Start: 2019-03-17 | End: 2019-03-22

## 2019-03-17 RX ADMIN — DICYCLOMINE HYDROCHLORIDE 20 MG: 20 INJECTION, SOLUTION INTRAMUSCULAR at 00:00

## 2019-03-17 RX ADMIN — METOCLOPRAMIDE 10 MG: 5 INJECTION, SOLUTION INTRAMUSCULAR; INTRAVENOUS at 00:00

## 2019-03-17 RX ADMIN — SODIUM CHLORIDE 1000 ML: 9 INJECTION, SOLUTION INTRAVENOUS at 00:00

## 2019-03-17 NOTE — ED TRIAGE NOTES
"Chief Complaint   Patient presents with   • N/V     Past 24 hours.    • Diarrhea     one episode earlier today.       /88   Pulse 92   Temp 36.7 °C (98 °F) (Temporal)   Resp 16   Ht 1.676 m (5' 6\")   Wt 64.3 kg (141 lb 12.1 oz)   SpO2 98%   BMI 22.88 kg/m²     PT comes in today with N/V for past 24 hours. VSS on RA. Pt received 4mg SL zofran from EMS.    Pt was given methadone and melatonin by his girlfriend to help him sleep.     Pt visibly anxious, insisting that staff wheeling him in wheelchair. Pt is ambulatory.    VSS. Back to lobby in wheelchair, encouraged to alert staff to any changes in condition.  "

## 2019-03-17 NOTE — ED PROVIDER NOTES
"ED Provider Note    CHIEF COMPLAINT  Chief Complaint   Patient presents with   • N/V     Past 24 hours.    • Diarrhea     one episode earlier today.       HPI  Osmar Loomis is a 53 y.o. male who presents to the emergency department chief complaint of nausea vomiting and generalized abdominal pain with diarrhea over the last 24 hours.  Prior to arrival his partner gave him some oral methadone he states did not help with the symptoms.  He was given Zofran by EMS and he states he still feels a little nauseated although his last episode of emesis was several hours ago.  He denies blood in his stool or emesis she denies any fevers or chills.  He denies any chest pain or shortness of breath he does states his abdomen is been cramping there is no back or flank pain associated with this    REVIEW OF SYSTEMS  Positives as above. Pertinent negatives include hematemesis blood in the stool fevers chills chest pain shortness of breath  All other review of systems are negative    PAST MEDICAL HISTORY   has a past medical history of Asthma and COPD (chronic obstructive pulmonary disease) ([facility]).    SOCIAL HISTORY  Social History     Social History Main Topics   • Smoking status: Former Smoker     Packs/day: 0.02     Quit date: 7/17/2017   • Smokeless tobacco: Never Used   • Alcohol use Yes      Comment: occ, drank today   • Drug use: Yes      Comment: rarely   • Sexual activity: Not on file       SURGICAL HISTORY  patient denies any surgical history    CURRENT MEDICATIONS  Home Medications    **Home medications have not yet been reviewed for this encounter**         ALLERGIES  Allergies   Allergen Reactions   • Pcn [Penicillins] Unspecified     \"told as a child\"       PHYSICAL EXAM  VITAL SIGNS: /88   Pulse 92   Temp 36.7 °C (98 °F) (Temporal)   Resp 16   Ht 1.676 m (5' 6\")   Wt 64.3 kg (141 lb 12.1 oz)   SpO2 98%   BMI 22.88 kg/m²    Pulse ox interpretation: I interpret this pulse ox as normal.  Constitutional: " Alert in no apparent distress.  HENT: Normocephalic atraumatic, dry MM  Eyes: PER, Conjunctiva normal, Non-icteric.   Neck: Normal range of motion, No tenderness, Supple, No stridor.   Cardiovascular: Regular rate and rhythm, no murmurs.   Thorax & Lungs: Normal breath sounds, No respiratory distress, No wheezing, No chest tenderness.   Abdomen: Bowel sounds hyperactive, with generalized abd ttp, No pulsatile masses. No peritoneal signs.  Skin: Warm, Dry, No erythema, No rash.   Back: No bony tenderness, No CVA tenderness.   Extremities: Intact distal pulses, No edema, No tenderness, No cyanosis  Musculoskeletal: Good range of motion in all major joints. No tenderness to palpation or major deformities noted.   Neurologic: Alert and oriented x3, No focal deficits noted.       DIFFERENTIAL DIAGNOSIS AND WORK UP PLAN    This is a 53 y.o. male who presents with nausea vomiting mild diarrhea generalized abdominal discomfort he is kind of uncomfortable appearing at the bedside has generalized abdominal tenderness no pinpoint tenderness possible viral gastroenteritis possible bowel obstruction versus colitis or diverticulitis.  Treat patient with IV pain management antiemetics laboratory analysis and a CT scan of the abdomen    DIAGNOSTIC STUDIES / PROCEDURES      LABS  Pertinent Lab Findings  CBC CMP lipase all within normal limits panel mild left shift      RADIOLOGY  CT-ABDOMEN-PELVIS WITH   Final Result            1. No acute abnormality. No etiology for patient's pain is identified.   2. Small fat-containing right inguinal hernia.                 The radiologist's interpretation of all radiological studies have been reviewed by me.      COURSE & MEDICAL DECISION MAKING  Pertinent Labs & Imaging studies reviewed. (See chart for details)    12:42 AM  I reassessed patient the bedside is resting comfortably he is tolerating ice chips he is otherwise well-appearing.  He feels comfortable going home will be sent home with  Zofran and Bentyl and strict return precautions for any new or worsening symptoms despite treatment at home.  He understands feels comfortable going home    The patient will return for new or worsening symptoms and is stable at the time of discharge.    The patient is referred to a primary physician for blood pressure management, diabetic screening, and for all other preventative health concerns.    DISPOSITION:  Patient will be discharged home in stable condition.    FOLLOW UP:  [facility], Emergency Dept  [address] [facility] [id number] [phone]    If symptoms worsen    [facility]  [address] [place] [id number]  [phone]  Schedule an appointment as soon as possible for a visit   for blood pressure recheck      OUTPATIENT MEDICATIONS:  Discharge Medication List as of 3/17/2019  1:07 AM      START taking these medications    Details   ondansetron (ZOFRAN ODT) 4 MG TABLET DISPERSIBLE Take 1 Tab by mouth every 6 hours as needed., Disp-8 Tab, R-0, Normal      dicyclomine (BENTYL) 20 MG Tab Take 1 Tab by mouth every 6 hours for 5 days., Disp-20 Tab, R-0, Normal               FINAL IMPRESSION  1. Nausea vomiting and diarrhea    2. Abdominal cramping              Electronically signed by: Lucie Lea, 3/16/2019 11:31 PM    This dictation has been created using voice recognition software and/or scribes. The accuracy of the dictation is limited by the abilities of the software and the expertise of the scribes. I expect there may be some errors of grammar and possibly content. I made every attempt to manually correct the errors within my dictation. However, errors related to voice recognition software and/or scribes may still exist and should be interpreted within the appropriate context.

## 2019-03-19 NOTE — DISCHARGE PLANNING
Pt in triage desk asking for a copy of his DC paperwork to show his employer (per dc note it was given) pt states he lost it. AVS reprinted and given to pt.

## 2024-06-12 ENCOUNTER — HOSPITAL ENCOUNTER (EMERGENCY)
Facility: MEDICAL CENTER | Age: 59
End: 2024-06-12
Attending: EMERGENCY MEDICINE
Payer: MEDICAID

## 2024-06-12 VITALS
HEIGHT: 66 IN | OXYGEN SATURATION: 97 % | SYSTOLIC BLOOD PRESSURE: 145 MMHG | WEIGHT: 160 LBS | HEART RATE: 76 BPM | TEMPERATURE: 96.9 F | RESPIRATION RATE: 16 BRPM | DIASTOLIC BLOOD PRESSURE: 87 MMHG | BODY MASS INDEX: 25.71 KG/M2

## 2024-06-12 DIAGNOSIS — Z76.0 MEDICATION REFILL: ICD-10-CM

## 2024-06-12 DIAGNOSIS — J45.901 MODERATE ASTHMA WITH ACUTE EXACERBATION, UNSPECIFIED WHETHER PERSISTENT: ICD-10-CM

## 2024-06-12 LAB — EKG IMPRESSION: NORMAL

## 2024-06-12 PROCEDURE — 700111 HCHG RX REV CODE 636 W/ 250 OVERRIDE (IP): Mod: UD | Performed by: EMERGENCY MEDICINE

## 2024-06-12 PROCEDURE — 93005 ELECTROCARDIOGRAM TRACING: CPT | Performed by: EMERGENCY MEDICINE

## 2024-06-12 PROCEDURE — 94640 AIRWAY INHALATION TREATMENT: CPT

## 2024-06-12 PROCEDURE — 700101 HCHG RX REV CODE 250: Mod: UD | Performed by: EMERGENCY MEDICINE

## 2024-06-12 PROCEDURE — 99284 EMERGENCY DEPT VISIT MOD MDM: CPT

## 2024-06-12 PROCEDURE — 93005 ELECTROCARDIOGRAM TRACING: CPT

## 2024-06-12 RX ORDER — ALBUTEROL SULFATE 90 UG/1
2 AEROSOL, METERED RESPIRATORY (INHALATION) EVERY 6 HOURS PRN
Qty: 1 EACH | Refills: 3 | Status: SHIPPED | OUTPATIENT
Start: 2024-06-12

## 2024-06-12 RX ORDER — PREDNISONE 20 MG/1
60 TABLET ORAL ONCE
Status: COMPLETED | OUTPATIENT
Start: 2024-06-12 | End: 2024-06-12

## 2024-06-12 RX ORDER — PREDNISONE 20 MG/1
60 TABLET ORAL DAILY
Qty: 12 TABLET | Refills: 0 | Status: SHIPPED | OUTPATIENT
Start: 2024-06-12 | End: 2024-06-16

## 2024-06-12 RX ADMIN — PREDNISONE 60 MG: 20 TABLET ORAL at 10:55

## 2024-06-12 RX ADMIN — ALBUTEROL SULFATE 2.5 MG: 2.5 SOLUTION RESPIRATORY (INHALATION) at 10:24

## 2024-06-12 RX ADMIN — IPRATROPIUM BROMIDE 0.5 MG: 0.5 SOLUTION RESPIRATORY (INHALATION) at 10:24

## 2024-06-12 NOTE — ED NOTES
Pt d/c from ED a/o x 4 GCS 15 ambulatory without assistance with steady gait. Pt given d/c instructions and verbalized understanding.

## 2024-06-12 NOTE — ED TRIAGE NOTES
"Chief Complaint   Patient presents with    Asthma     Hx asthma, c/o his typical symptoms of wheezing with chest 'tightness', states he has been out of his asthma rx X 1 month and these symptoms have been increasing X 3 weeks, no respiratory distress or audible wheeze noted in triage     Pt ambulatory to triage for above complaints, VSS on RA, GCS 15, NAD.    Pt denies chest 'pain' or cardiac hx. States his 'tightness' is typical for his asthma.    Pt returned to [facility]. Educated on triage process and to inform staff of any changes.     BP (!) 146/106   Pulse 99   Temp 36.6 °C (97.8 °F) (Temporal)   Resp 20   Ht 1.676 m (5' 6\")   Wt 72.6 kg (160 lb)   SpO2 98%   BMI 25.82 kg/m²     "

## 2024-06-12 NOTE — ED PROVIDER NOTES
ED Provider Note    CHIEF COMPLAINT  Chief Complaint   Patient presents with    Asthma     Hx asthma, c/o his typical symptoms of wheezing with chest 'tightness', states he has been out of his asthma rx X 1 month and these symptoms have been increasing X 3 weeks, no respiratory distress or audible wheeze noted in triage     PDMP database reviewed, no active prescriptions for sedatives or narcotic      HPI/ROS  LIMITATION TO HISTORY   Select: : None  OUTSIDE HISTORIAN(S):  None    Osmar Loomis is a 58 y.o. male who presents with complaint of difficulty breathing, worse with exertion.  He states he ran out of his albuterol a month ago, has been unable to secure appointment to get refills.  No chest pain.  States when his asthma worsens he has a tightness feeling in his chest this is present now over the last several weeks.  No leg swelling.  No cough, no fever, no rhinorrhea.    PAST MEDICAL HISTORY   has a past medical history of Asthma and COPD (chronic obstructive pulmonary disease) (HCC).    SURGICAL HISTORY  patient denies any surgical history    FAMILY HISTORY  No family history on file.    SOCIAL HISTORY  Social History     Tobacco Use    Smoking status: Former     Current packs/day: 0.00     Types: Cigarettes     Quit date: 2017     Years since quittin.9    Smokeless tobacco: Never   Substance and Sexual Activity    Alcohol use: Yes     Comment: occ, drank today    Drug use: Yes     Comment: rarely    Sexual activity: Not on file       CURRENT MEDICATIONS  Home Medications       Reviewed by Rowdy Lujan R.N. (Registered Nurse) on 24 at 0903  Med List Status: Not Addressed     Medication Last Dose Status   albuterol (PROVENTIL) 2.5mg/3ml Nebu Soln solution for nebulization  Active   albuterol 108 (90 Base) MCG/ACT Aero Soln inhalation aerosol  Active   azithromycin (ZITHROMAX) 250 MG Tab  Active   fluticasone-salmeterol (ADVAIR) 250-50 MCG/DOSE AEROSOL POWDER, BREATH ACTIVATED   "Active   methylPREDNISolone (MEDROL) 4 MG Tab  Active   ondansetron (ZOFRAN ODT) 4 MG TABLET DISPERSIBLE  Active   tiotropium (SPIRIVA) 18 MCG Cap  Active                    ALLERGIES  Allergies   Allergen Reactions    Pcn [Penicillins] Unspecified     \"told as a child\"       PHYSICAL EXAM  VITAL SIGNS: BP (!) 146/106   Pulse 99   Temp 36.6 °C (97.8 °F) (Temporal)   Resp 20   Ht 1.676 m (5' 6\")   Wt 72.6 kg (160 lb)   SpO2 98%   BMI 25.82 kg/m²    Respiratory: Diminished air movement bilateral, with expiratory wheezing.  No crackles  Cardiac: Regular rate, regular rhythm  GI: No distention, nontender  Skin: No peripheral edema.  No cyanosis, no rash  Neurologic: Strength intact, speech clear  ENT: Posterior pharynx normal.  No facial swelling      COURSE & MEDICAL DECISION MAKING    ASSESSMENT, COURSE AND PLAN  Care Narrative: Patient presents out of his asthma medications, with diminished breath sounds and x-ray wheezing.  No hypoxia at this time.  His symptoms treated with albuterol and Atrovent, also oral prednisone.  Repeat exam shows much improved air movement with diminished wheezing, continues to maintain adequate pulse oximetry.  We discussed benefits and risks of chest x-ray, at this time patient has declined this testing.  He is willing to return should symptoms worsen.  He remains afebrile.  Patient is prescribed additional 4 days of prednisone, prescribed albuterol inhaler.  He is discharged in improved condition.        DISPOSITION AND DISCUSSIONS  Discussion of management with other QHP or appropriate source(s): RT for assessment, respiratory therapy for acute bronchospasm    Escalation of care considered, and ultimately not performed:Laboratory analysis and diagnostic imaging    Barriers to care at this time, including but not limited to: Patient does not have established PCP.     Decision tools and prescription drugs considered including, but not limited to: Antibiotics were not indicated, no " evidence of bacterial infection, patient declined chest x-ray .    FINAL DIAGNOSIS  1. Moderate asthma with acute exacerbation, unspecified whether persistent    2. Medication refill           Electronically signed by: Ruben Almanza M.D., 6/12/2024 10:13 AM

## 2024-06-12 NOTE — DISCHARGE INSTRUCTIONS
Return for worsening breathing, chest pains, or any concerns.  See a doctor for recheck in 1 week if no improvement

## 2025-03-23 ENCOUNTER — HOSPITAL ENCOUNTER (EMERGENCY)
Facility: MEDICAL CENTER | Age: 60
End: 2025-03-23
Attending: EMERGENCY MEDICINE
Payer: MEDICAID

## 2025-03-23 VITALS
HEIGHT: 66 IN | SYSTOLIC BLOOD PRESSURE: 94 MMHG | WEIGHT: 173.28 LBS | BODY MASS INDEX: 27.85 KG/M2 | TEMPERATURE: 96.8 F | OXYGEN SATURATION: 96 % | RESPIRATION RATE: 16 BRPM | DIASTOLIC BLOOD PRESSURE: 61 MMHG | HEART RATE: 61 BPM

## 2025-03-23 DIAGNOSIS — H61.23 BILATERAL HEARING LOSS DUE TO CERUMEN IMPACTION: ICD-10-CM

## 2025-03-23 PROCEDURE — 700102 HCHG RX REV CODE 250 W/ 637 OVERRIDE(OP): Mod: UD | Performed by: EMERGENCY MEDICINE

## 2025-03-23 PROCEDURE — 69210 REMOVE IMPACTED EAR WAX UNI: CPT

## 2025-03-23 PROCEDURE — A9270 NON-COVERED ITEM OR SERVICE: HCPCS | Mod: UD | Performed by: EMERGENCY MEDICINE

## 2025-03-23 PROCEDURE — 99282 EMERGENCY DEPT VISIT SF MDM: CPT

## 2025-03-23 RX ORDER — DOCUSATE SODIUM 50 MG/5ML
100 LIQUID ORAL ONCE
Status: COMPLETED | OUTPATIENT
Start: 2025-03-23 | End: 2025-03-23

## 2025-03-23 RX ADMIN — DOCUSATE SODIUM 100 MG: 50 LIQUID ORAL at 14:09

## 2025-03-23 NOTE — ED TRIAGE NOTES
Chief Complaint   Patient presents with    Hearing Loss     Pt reports bilateral hearing loss for the last couple of weeks. States it feels like there is fluid in his ears. States similar thing happened last summer and it resolved on its own. 4/10 pressure like pain.       Patient ambulatory to triage for above complaint. Patient A&Ox4, GCS 15, patient speaking in full sentences. Equal and unlabored respirations. Patient educated on triage process and encouraged to notify staff if condition worsens. Appropriate protocols ordered. Patient returned to the lobby in stable condition.

## 2025-03-23 NOTE — ED NOTES
Pt here due to hearing loss. Says that he feels pressure in both ears, as if there is fluid. He says he has experienced tinnitus in the past. 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 Provided gown and attached to monitor. Is comfortable at this time with call light within reach. ERP ready to see.

## 2025-03-23 NOTE — ED PROVIDER NOTES
"ED Provider Note    Primary care provider: Pcp Pt States None    CHIEF COMPLAINT  Chief Complaint   Patient presents with    Hearing Loss     Pt reports bilateral hearing loss for the last couple of weeks. States it feels like there is fluid in his ears. States similar thing happened last summer and it resolved on its own. 4/10 pressure like pain.        HPI  Osmar Loomis is a 59 y.o. male who presents to the Emergency Department with bilateral hearing loss.  The patient has gradually worsening bilateral hearing as well as bilateral tinnitus.  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    External record review: Patient with history of COPD, makes a fair amount of ER visits, was seen at [facility] 3 times last year, did make a ER visit there in February of last year for cerumen impaction.       PAST MEDICAL HISTORY   has a past medical history of Asthma and COPD (chronic obstructive pulmonary disease) (HCC).    SURGICAL HISTORY  patient denies any surgical history    PHYSICAL EXAM  VITAL SIGNS: /67   Pulse 63   Temp 36 °C (96.8 °F)   Resp 17   Ht 1.676 m (5' 6\")   Wt 78.6 kg (173 lb 4.5 oz)   SpO2 95%   BMI 27.97 kg/m²   Constitutional: Awake, alert in no apparent distress.  HENT: Normocephalic, Bilateral external ears normal. Nose normal.  Both tympanic membranes were completely occluded due to wax.  I removed copious amounts of cerumen, the left tympanic membrane is normal, the right is still partially occluded due to impacted cerumen.  Eyes: Conjunctiva normal, non-icteric, EOMI.    Thorax & Lungs: Easy unlabored respirations  Cardiovascular:    Abdomen:  No distention  Skin: Visualized skin is  Dry, No erythema, No rash.   Extremities:   atraumatic   Neurologic: Alert, Grossly non-focal.   Psychiatric: Affect and Mood normal    Procedure note:  Using a lit curette, I removed an impressive amount of cerumen from the " bilateral ears.  The left ear canal is a little bit excoriated afterwards but otherwise the procedure was tolerated well without any immediate complications and the patient notes significant improvement in his hearing.  He was then irrigated with Colace and had additional cerumen removal.    Escalation of care considered, and ultimately not performed: diagnostic imaging.    Barriers to care at this time, including but not limited to: Patient does not have established PCP.   .    Decision Making:  This is a pleasant 59 y.o. year old male who presents with bilateral tinnitus and hearing loss, he has an impressive amount of cerumen that was carefully removed as above.  I do not appreciate any signs of infection, hearing has improved.  Recommend follow-up with primary, we discussed typical earwax maintenance.       The patient was discharged (see d/c instructions) was told to return immediately for any signs or symptoms listed, or any worsening at all.  The patient verbally agreed to the discharge precautions and follow-up plan which is documented in EPIC.    Discharge Medications:  New Prescriptions    No medications on file       FINAL IMPRESSION  1. Bilateral hearing loss due to cerumen impaction